# Patient Record
Sex: FEMALE | Race: BLACK OR AFRICAN AMERICAN | NOT HISPANIC OR LATINO | URBAN - METROPOLITAN AREA
[De-identification: names, ages, dates, MRNs, and addresses within clinical notes are randomized per-mention and may not be internally consistent; named-entity substitution may affect disease eponyms.]

---

## 2017-01-31 ENCOUNTER — INPATIENT (INPATIENT)
Facility: HOSPITAL | Age: 57
LOS: 2 days | Discharge: HOME | End: 2017-02-03
Attending: INTERNAL MEDICINE | Admitting: INTERNAL MEDICINE
Payer: COMMERCIAL

## 2017-01-31 VITALS
OXYGEN SATURATION: 99 % | RESPIRATION RATE: 18 BRPM | HEART RATE: 106 BPM | TEMPERATURE: 99 F | DIASTOLIC BLOOD PRESSURE: 83 MMHG | SYSTOLIC BLOOD PRESSURE: 121 MMHG

## 2017-01-31 PROBLEM — Z00.00 ENCOUNTER FOR PREVENTIVE HEALTH EXAMINATION: Status: ACTIVE | Noted: 2017-01-31

## 2017-01-31 LAB
ALBUMIN SERPL ELPH-MCNC: 4.3 G/DL — SIGNIFICANT CHANGE UP (ref 3.3–5)
ALP SERPL-CCNC: 76 U/L — SIGNIFICANT CHANGE UP (ref 40–120)
ALT FLD-CCNC: 24 U/L — SIGNIFICANT CHANGE UP (ref 4–33)
APPEARANCE UR: CLEAR — SIGNIFICANT CHANGE UP
APTT BLD: 35.9 SEC — SIGNIFICANT CHANGE UP (ref 27.5–37.4)
AST SERPL-CCNC: 26 U/L — SIGNIFICANT CHANGE UP (ref 4–32)
BACTERIA # UR AUTO: SIGNIFICANT CHANGE UP
BASE EXCESS BLDV CALC-SCNC: 4.7 MMOL/L — SIGNIFICANT CHANGE UP
BASOPHILS # BLD AUTO: 0.03 K/UL — SIGNIFICANT CHANGE UP (ref 0–0.2)
BASOPHILS NFR BLD AUTO: 0.5 % — SIGNIFICANT CHANGE UP (ref 0–2)
BILIRUB SERPL-MCNC: 0.4 MG/DL — SIGNIFICANT CHANGE UP (ref 0.2–1.2)
BILIRUB UR-MCNC: NEGATIVE — SIGNIFICANT CHANGE UP
BLOOD GAS VENOUS - CREATININE: 0.89 MG/DL — SIGNIFICANT CHANGE UP (ref 0.5–1.3)
BLOOD UR QL VISUAL: NEGATIVE — SIGNIFICANT CHANGE UP
BUN SERPL-MCNC: 14 MG/DL — SIGNIFICANT CHANGE UP (ref 7–23)
CALCIUM SERPL-MCNC: 9.4 MG/DL — SIGNIFICANT CHANGE UP (ref 8.4–10.5)
CHLORIDE BLDV-SCNC: 100 MMOL/L — SIGNIFICANT CHANGE UP (ref 96–108)
CHLORIDE SERPL-SCNC: 96 MMOL/L — LOW (ref 98–107)
CK MB BLD-MCNC: 1.4 — SIGNIFICANT CHANGE UP (ref 0–2.5)
CK MB BLD-MCNC: 2.43 NG/ML — SIGNIFICANT CHANGE UP (ref 1–4.7)
CK MB BLD-MCNC: 2.77 NG/ML — SIGNIFICANT CHANGE UP (ref 1–4.7)
CK SERPL-CCNC: 171 U/L — HIGH (ref 25–170)
CK SERPL-CCNC: 201 U/L — HIGH (ref 25–170)
CO2 SERPL-SCNC: 25 MMOL/L — SIGNIFICANT CHANGE UP (ref 22–31)
COLOR SPEC: SIGNIFICANT CHANGE UP
CREAT SERPL-MCNC: 0.92 MG/DL — SIGNIFICANT CHANGE UP (ref 0.5–1.3)
EOSINOPHIL # BLD AUTO: 0.01 K/UL — SIGNIFICANT CHANGE UP (ref 0–0.5)
EOSINOPHIL NFR BLD AUTO: 0.2 % — SIGNIFICANT CHANGE UP (ref 0–6)
GAS PNL BLDV: 135 MMOL/L — LOW (ref 136–146)
GLUCOSE BLDV-MCNC: 102 — HIGH (ref 70–99)
GLUCOSE SERPL-MCNC: 102 MG/DL — HIGH (ref 70–99)
GLUCOSE UR-MCNC: NEGATIVE — SIGNIFICANT CHANGE UP
HBA1C BLD-MCNC: 5.9 % — HIGH (ref 4–5.6)
HCO3 BLDV-SCNC: 27 MMOL/L — SIGNIFICANT CHANGE UP (ref 20–27)
HCT VFR BLD CALC: 36.2 % — SIGNIFICANT CHANGE UP (ref 34.5–45)
HCT VFR BLDV CALC: 36.9 % — SIGNIFICANT CHANGE UP (ref 34.5–45)
HGB BLD-MCNC: 11.7 G/DL — SIGNIFICANT CHANGE UP (ref 11.5–15.5)
HGB BLDV-MCNC: 12 G/DL — SIGNIFICANT CHANGE UP (ref 11.5–15.5)
IMM GRANULOCYTES NFR BLD AUTO: 0.3 % — SIGNIFICANT CHANGE UP (ref 0–1.5)
INR BLD: 1.12 — SIGNIFICANT CHANGE UP (ref 0.87–1.18)
KETONES UR-MCNC: NEGATIVE — SIGNIFICANT CHANGE UP
LACTATE BLDV-MCNC: 0.9 MMOL/L — SIGNIFICANT CHANGE UP (ref 0.5–2)
LEUKOCYTE ESTERASE UR-ACNC: HIGH
LYMPHOCYTES # BLD AUTO: 1.63 K/UL — SIGNIFICANT CHANGE UP (ref 1–3.3)
LYMPHOCYTES # BLD AUTO: 25.3 % — SIGNIFICANT CHANGE UP (ref 13–44)
MCHC RBC-ENTMCNC: 26.2 PG — LOW (ref 27–34)
MCHC RBC-ENTMCNC: 32.3 % — SIGNIFICANT CHANGE UP (ref 32–36)
MCV RBC AUTO: 81 FL — SIGNIFICANT CHANGE UP (ref 80–100)
MONOCYTES # BLD AUTO: 0.52 K/UL — SIGNIFICANT CHANGE UP (ref 0–0.9)
MONOCYTES NFR BLD AUTO: 8.1 % — SIGNIFICANT CHANGE UP (ref 2–14)
MUCOUS THREADS # UR AUTO: SIGNIFICANT CHANGE UP
NEUTROPHILS # BLD AUTO: 4.22 K/UL — SIGNIFICANT CHANGE UP (ref 1.8–7.4)
NEUTROPHILS NFR BLD AUTO: 65.6 % — SIGNIFICANT CHANGE UP (ref 43–77)
NITRITE UR-MCNC: NEGATIVE — SIGNIFICANT CHANGE UP
NON-SQ EPI CELLS # UR AUTO: 1 — SIGNIFICANT CHANGE UP
PCO2 BLDV: 51 MMHG — SIGNIFICANT CHANGE UP (ref 41–51)
PH BLDV: 7.38 PH — SIGNIFICANT CHANGE UP (ref 7.32–7.43)
PH UR: 6 — SIGNIFICANT CHANGE UP (ref 4.6–8)
PLATELET # BLD AUTO: 258 K/UL — SIGNIFICANT CHANGE UP (ref 150–400)
PMV BLD: 10.3 FL — SIGNIFICANT CHANGE UP (ref 7–13)
PO2 BLDV: < 24 MMHG — LOW (ref 35–40)
POTASSIUM BLDV-SCNC: 3.2 MMOL/L — LOW (ref 3.4–4.5)
POTASSIUM SERPL-MCNC: 3.4 MMOL/L — LOW (ref 3.5–5.3)
POTASSIUM SERPL-SCNC: 3.4 MMOL/L — LOW (ref 3.5–5.3)
PROT SERPL-MCNC: 8.2 G/DL — SIGNIFICANT CHANGE UP (ref 6–8.3)
PROT UR-MCNC: NEGATIVE — SIGNIFICANT CHANGE UP
PROTHROM AB SERPL-ACNC: 12.8 SEC — SIGNIFICANT CHANGE UP (ref 10–13.1)
RBC # BLD: 4.47 M/UL — SIGNIFICANT CHANGE UP (ref 3.8–5.2)
RBC # FLD: 16.1 % — HIGH (ref 10.3–14.5)
RBC CASTS # UR COMP ASSIST: SIGNIFICANT CHANGE UP (ref 0–?)
SAO2 % BLDV: 26.4 % — LOW (ref 60–85)
SODIUM SERPL-SCNC: 138 MMOL/L — SIGNIFICANT CHANGE UP (ref 135–145)
SP GR SPEC: 1.01 — SIGNIFICANT CHANGE UP (ref 1–1.03)
SQUAMOUS # UR AUTO: SIGNIFICANT CHANGE UP
TROPONIN T SERPL-MCNC: < 0.06 NG/ML — SIGNIFICANT CHANGE UP (ref 0–0.06)
TROPONIN T SERPL-MCNC: < 0.06 NG/ML — SIGNIFICANT CHANGE UP (ref 0–0.06)
TSH SERPL-MCNC: 0.4 UIU/ML — SIGNIFICANT CHANGE UP (ref 0.27–4.2)
UROBILINOGEN FLD QL: NORMAL E.U. — SIGNIFICANT CHANGE UP (ref 0.1–0.2)
WBC # BLD: 6.43 K/UL — SIGNIFICANT CHANGE UP (ref 3.8–10.5)
WBC # FLD AUTO: 6.43 K/UL — SIGNIFICANT CHANGE UP (ref 3.8–10.5)
WBC UR QL: SIGNIFICANT CHANGE UP (ref 0–?)

## 2017-01-31 RX ORDER — LOSARTAN POTASSIUM 100 MG/1
50 TABLET, FILM COATED ORAL DAILY
Qty: 0 | Refills: 0 | Status: DISCONTINUED | OUTPATIENT
Start: 2017-01-31 | End: 2017-02-01

## 2017-01-31 RX ORDER — SODIUM CHLORIDE 9 MG/ML
1000 INJECTION INTRAMUSCULAR; INTRAVENOUS; SUBCUTANEOUS ONCE
Qty: 0 | Refills: 0 | Status: COMPLETED | OUTPATIENT
Start: 2017-01-31 | End: 2017-01-31

## 2017-01-31 RX ADMIN — LOSARTAN POTASSIUM 50 MILLIGRAM(S): 100 TABLET, FILM COATED ORAL at 20:03

## 2017-01-31 RX ADMIN — SODIUM CHLORIDE 1000 MILLILITER(S): 9 INJECTION INTRAMUSCULAR; INTRAVENOUS; SUBCUTANEOUS at 12:05

## 2017-01-31 NOTE — ED ADULT NURSE NOTE - CAS EDN DISCHARGE ASSESSMENT
Patient baseline mental status Patient baseline mental status/Alert and oriented to person, place and time

## 2017-01-31 NOTE — ED PROVIDER NOTE - ATTENDING CONTRIBUTION TO CARE
DR Bloch-Patient c/o 2 weeks of palpitations, intermittently, not @ CP SOB, n. no change in meds, no hx of CAD, has htn, Well appearing NAD, HEENT nml, no thyromrgaly, Heart sounds, 3/6 JASON, lungs clear, no abd or chest wall tenderness. Reflexes nml, no calf pain. ECG with LVH and 1mm ST pita V2 no reciprocal changes. ocasional PVCs juliano patient reports as palpitations.- labs , CE, cxr. echo CDU

## 2017-01-31 NOTE — ED PROVIDER NOTE - OBJECTIVE STATEMENT
56F h/o HTN, HLD, GERD p/w intermittent palpitations worse at night when laying down x 2-3 weeks. Endorses CP lasting 5 minutes 2 weeks ago. No history of sudden death in family. Denies SOB, fever, vomiting, sick contacts, recent travel, leg pain, leg swelling, OCP use.

## 2017-01-31 NOTE — ED PROVIDER NOTE - MEDICAL DECISION MAKING DETAILS
Palpitations intermittent x 2-3 weeks. LVH, systolic murmur (old) and ?elevation on EKG.   - CBC, CMP, cardiac enzymes, TSH, CXR, reassess. Consider admission vs CDU for echo and r/o ACS

## 2017-01-31 NOTE — ED CDU PROVIDER NOTE - OBJECTIVE STATEMENT
56F h/o HTN, HLD, GERD presented to the ED with intermittent palpitations that are worse at night when laying down x 3 weeks. + CP 2 weeks ago lasting 5-10 minutes. went away on its own. no associated symptoms including n/v diaphoesis, headache, weakness, syncope  No carduiac hx in family that she knows of  Denies SOB, fever, vomiting, sick contacts, recent travel, leg pain, leg swelling, OCP use.  Has an appointment with a cardiologist but not until 2/28

## 2017-01-31 NOTE — ED CDU PROVIDER NOTE - MEDICAL DECISION MAKING DETAILS
55 yo f c/o cp and palpitations found to have murmur on exam. will do second set of cardiac enzymes and echo in AM. cardiology consult if needed

## 2017-01-31 NOTE — ED CDU PROVIDER NOTE - PROGRESS NOTE DETAILS
CDU - SHASHI Hidalgo Pt. currently resting comfortably, no complaints at this time. Will continue current plan and observation. : 56F h/o HTN, HLD, GERD presented to the ED with intermittent palpitations that are worse at night when laying down x 3 weeks.pt remained stable overnight and exam remarkable for murmur . will obtain echo this AM SHASHI [place]: Pt noted to have severe MR and severe LVOT on echo, pt is hemodynamically stable in NAD, called cardiology for consult.  Discharge Note pt remained stable pt ok to go home depending on cardiology recommendation with out pt follow up . CDU SHASHI Robertson Addendum-------  I rec'd sign-out on this patient by CDU day attending Dr. Paz and CDU day SHASHI Canada at 1900 hrs.  In interim, pt has been resting comfortably.  The cardiology fellow came and evaluated this patient; he advised to discontinue Losartan and HCTZ (verbalizes awareness that pt had dose of each this morning) and start pt on metoprolol 25 mg tonight; states if HR tolerates med, can increase to metoprolol 50 mg BID on 2/2/17.  In addition, would like pt to get normal saline 250 mL bolus now; states can admit to Tele under Dr. Sanchez; card attending to see pt in the morning.  I spoke to pt about the plan; pt verbalizes agreement with plan.  Tele PA verbally notified by me of Tele admission.

## 2017-02-01 DIAGNOSIS — Q24.8 OTHER SPECIFIED CONGENITAL MALFORMATIONS OF HEART: ICD-10-CM

## 2017-02-01 DIAGNOSIS — E87.6 HYPOKALEMIA: ICD-10-CM

## 2017-02-01 DIAGNOSIS — I10 ESSENTIAL (PRIMARY) HYPERTENSION: ICD-10-CM

## 2017-02-01 DIAGNOSIS — E78.5 HYPERLIPIDEMIA, UNSPECIFIED: ICD-10-CM

## 2017-02-01 DIAGNOSIS — Z41.8 ENCOUNTER FOR OTHER PROCEDURES FOR PURPOSES OTHER THAN REMEDYING HEALTH STATE: ICD-10-CM

## 2017-02-01 DIAGNOSIS — R01.1 CARDIAC MURMUR, UNSPECIFIED: ICD-10-CM

## 2017-02-01 DIAGNOSIS — K21.9 GASTRO-ESOPHAGEAL REFLUX DISEASE WITHOUT ESOPHAGITIS: ICD-10-CM

## 2017-02-01 DIAGNOSIS — N39.0 URINARY TRACT INFECTION, SITE NOT SPECIFIED: ICD-10-CM

## 2017-02-01 LAB
APPEARANCE UR: CLEAR — SIGNIFICANT CHANGE UP
BACTERIA # UR AUTO: SIGNIFICANT CHANGE UP
BILIRUB UR-MCNC: NEGATIVE — SIGNIFICANT CHANGE UP
BLOOD UR QL VISUAL: NEGATIVE — SIGNIFICANT CHANGE UP
COLOR SPEC: SIGNIFICANT CHANGE UP
GLUCOSE UR-MCNC: NEGATIVE — SIGNIFICANT CHANGE UP
KETONES UR-MCNC: NEGATIVE — SIGNIFICANT CHANGE UP
LEUKOCYTE ESTERASE UR-ACNC: HIGH
MUCOUS THREADS # UR AUTO: SIGNIFICANT CHANGE UP
NITRITE UR-MCNC: NEGATIVE — SIGNIFICANT CHANGE UP
PH UR: 6.5 — SIGNIFICANT CHANGE UP (ref 4.6–8)
PROT UR-MCNC: NEGATIVE — SIGNIFICANT CHANGE UP
RBC CASTS # UR COMP ASSIST: SIGNIFICANT CHANGE UP (ref 0–?)
SP GR SPEC: 1.01 — SIGNIFICANT CHANGE UP (ref 1–1.03)
SQUAMOUS # UR AUTO: SIGNIFICANT CHANGE UP
UROBILINOGEN FLD QL: NORMAL E.U. — SIGNIFICANT CHANGE UP (ref 0.1–0.2)
WBC UR QL: HIGH (ref 0–?)

## 2017-02-01 PROCEDURE — 93306 TTE W/DOPPLER COMPLETE: CPT | Mod: 26

## 2017-02-01 RX ORDER — METOPROLOL TARTRATE 50 MG
50 TABLET ORAL
Qty: 0 | Refills: 0 | Status: DISCONTINUED | OUTPATIENT
Start: 2017-02-01 | End: 2017-02-01

## 2017-02-01 RX ORDER — METOPROLOL TARTRATE 50 MG
25 TABLET ORAL ONCE
Qty: 0 | Refills: 0 | Status: COMPLETED | OUTPATIENT
Start: 2017-02-01 | End: 2017-02-01

## 2017-02-01 RX ORDER — METOPROLOL TARTRATE 50 MG
50 TABLET ORAL
Qty: 0 | Refills: 0 | Status: DISCONTINUED | OUTPATIENT
Start: 2017-02-02 | End: 2017-02-03

## 2017-02-01 RX ORDER — SODIUM CHLORIDE 9 MG/ML
250 INJECTION INTRAMUSCULAR; INTRAVENOUS; SUBCUTANEOUS ONCE
Qty: 0 | Refills: 0 | Status: COMPLETED | OUTPATIENT
Start: 2017-02-01 | End: 2017-02-01

## 2017-02-01 RX ADMIN — LOSARTAN POTASSIUM 50 MILLIGRAM(S): 100 TABLET, FILM COATED ORAL at 06:02

## 2017-02-01 RX ADMIN — SODIUM CHLORIDE 250 MILLILITER(S): 9 INJECTION INTRAMUSCULAR; INTRAVENOUS; SUBCUTANEOUS at 20:14

## 2017-02-01 RX ADMIN — Medication 25 MILLIGRAM(S): at 20:13

## 2017-02-01 NOTE — H&P ADULT. - RS GEN PE MLT RESP DETAILS PC
respirations non-labored/no rales/airway patent/breath sounds equal/normal/no rhonchi/no wheezes/no chest wall tenderness/good air movement/clear to auscultation bilaterally/no intercostal retractions

## 2017-02-01 NOTE — H&P ADULT. - PROBLEM SELECTOR PLAN 6
Routine blood pressure check.   DASH diet  Start metoprolol 25 mg, and increase to 50 mg BID if patient tolerates.

## 2017-02-01 NOTE — H&P ADULT. - ASSESSMENT
57 y/o F with h/o HTN, HLD, GERD, presents to the DE with intermittent palpitations for 3 weeks. Admit to telemetry for LVOT obstruction.

## 2017-02-01 NOTE — H&P ADULT. - HISTORY OF PRESENT ILLNESS
55 y/o F with h/o HTN, HLD,GERD, presents to the ED with intermittent palpitations for 2 weeks. Pt states the palpitations are usually worse at night and have been becoming increasingly worse. Pt reports it is associated with fatigue. Pt also states she has been having chest pain, usually lasting 5-10 minutes, midsternal, nonradiating. Pt denies syncope, LOC, shortness of breath, weakness, fever, chills, headache, N/V/D/C, peripheral edema, dysuria, hematuria, urinary/bowel incontinence or any other complaints at this time.

## 2017-02-01 NOTE — H&P ADULT. - GASTROINTESTINAL DETAILS
no rebound tenderness/normal/no guarding/no rigidity/bowel sounds normal/no distention/nontender/soft

## 2017-02-01 NOTE — H&P ADULT. - NEGATIVE ENMT SYMPTOMS
no recurrent cold sores/no nasal obstruction/no nasal congestion/no vertigo/no tinnitus/no hearing difficulty/no ear pain/no nose bleeds/no post-nasal discharge/no nasal discharge/no sinus symptoms

## 2017-02-01 NOTE — H&P ADULT. - NEGATIVE MUSCULOSKELETAL SYMPTOMS
no arthritis/no arm pain L/no myalgia/no arm pain R/no leg pain L/no back pain/no leg pain R/no muscle cramps/no joint swelling/no arthralgia/no muscle weakness/no neck pain/no stiffness

## 2017-02-01 NOTE — PATIENT PROFILE ADULT. - ABILITY TO HEAR (WITH HEARING AID OR HEARING APPLIANCE IF NORMALLY USED):
[place] R ear/Mildly to Moderately Impaired: difficulty hearing in some environments or speaker may need to increase volume or speak distinctly

## 2017-02-01 NOTE — H&P ADULT. - NEGATIVE OPHTHALMOLOGIC SYMPTOMS
no lacrimation L/no blurred vision L/no diplopia/no blurred vision R/no lacrimation R/no photophobia

## 2017-02-01 NOTE — H&P ADULT. - NEUROLOGICAL DETAILS
normal strength/responds to verbal commands/alert and oriented x 3/sensation intact/cranial nerves intact

## 2017-02-02 LAB
BUN SERPL-MCNC: 11 MG/DL — SIGNIFICANT CHANGE UP (ref 7–23)
CALCIUM SERPL-MCNC: 9.4 MG/DL — SIGNIFICANT CHANGE UP (ref 8.4–10.5)
CHLORIDE SERPL-SCNC: 100 MMOL/L — SIGNIFICANT CHANGE UP (ref 98–107)
CHOLEST SERPL-MCNC: 174 MG/DL — SIGNIFICANT CHANGE UP (ref 120–199)
CO2 SERPL-SCNC: 25 MMOL/L — SIGNIFICANT CHANGE UP (ref 22–31)
CREAT SERPL-MCNC: 0.82 MG/DL — SIGNIFICANT CHANGE UP (ref 0.5–1.3)
GLUCOSE SERPL-MCNC: 99 MG/DL — SIGNIFICANT CHANGE UP (ref 70–99)
HCT VFR BLD CALC: 35.6 % — SIGNIFICANT CHANGE UP (ref 34.5–45)
HDLC SERPL-MCNC: 63 MG/DL — SIGNIFICANT CHANGE UP (ref 45–65)
HGB BLD-MCNC: 11.3 G/DL — LOW (ref 11.5–15.5)
LIPID PNL WITH DIRECT LDL SERPL: 111 MG/DL — SIGNIFICANT CHANGE UP
MAGNESIUM SERPL-MCNC: 2 MG/DL — SIGNIFICANT CHANGE UP (ref 1.6–2.6)
MCHC RBC-ENTMCNC: 25.8 PG — LOW (ref 27–34)
MCHC RBC-ENTMCNC: 31.7 % — LOW (ref 32–36)
MCV RBC AUTO: 81.3 FL — SIGNIFICANT CHANGE UP (ref 80–100)
PLATELET # BLD AUTO: 241 K/UL — SIGNIFICANT CHANGE UP (ref 150–400)
PMV BLD: 10.4 FL — SIGNIFICANT CHANGE UP (ref 7–13)
POTASSIUM SERPL-MCNC: 3.2 MMOL/L — LOW (ref 3.5–5.3)
POTASSIUM SERPL-SCNC: 3.2 MMOL/L — LOW (ref 3.5–5.3)
RBC # BLD: 4.38 M/UL — SIGNIFICANT CHANGE UP (ref 3.8–5.2)
RBC # FLD: 16.1 % — HIGH (ref 10.3–14.5)
SODIUM SERPL-SCNC: 139 MMOL/L — SIGNIFICANT CHANGE UP (ref 135–145)
SPECIMEN SOURCE: SIGNIFICANT CHANGE UP
TRIGL SERPL-MCNC: 63 MG/DL — SIGNIFICANT CHANGE UP (ref 10–149)
WBC # BLD: 6.72 K/UL — SIGNIFICANT CHANGE UP (ref 3.8–10.5)
WBC # FLD AUTO: 6.72 K/UL — SIGNIFICANT CHANGE UP (ref 3.8–10.5)

## 2017-02-02 PROCEDURE — 76376 3D RENDER W/INTRP POSTPROCES: CPT | Mod: 26

## 2017-02-02 PROCEDURE — 93325 DOPPLER ECHO COLOR FLOW MAPG: CPT | Mod: 26,GC

## 2017-02-02 PROCEDURE — 93320 DOPPLER ECHO COMPLETE: CPT | Mod: 26,GC

## 2017-02-02 PROCEDURE — 99232 SBSQ HOSP IP/OBS MODERATE 35: CPT

## 2017-02-02 PROCEDURE — 93312 ECHO TRANSESOPHAGEAL: CPT | Mod: 26

## 2017-02-02 RX ORDER — METOPROLOL TARTRATE 50 MG
25 TABLET ORAL ONCE
Qty: 0 | Refills: 0 | Status: COMPLETED | OUTPATIENT
Start: 2017-02-02 | End: 2017-02-02

## 2017-02-02 RX ORDER — SODIUM CHLORIDE 9 MG/ML
1000 INJECTION INTRAMUSCULAR; INTRAVENOUS; SUBCUTANEOUS
Qty: 0 | Refills: 0 | Status: DISCONTINUED | OUTPATIENT
Start: 2017-02-02 | End: 2017-02-03

## 2017-02-02 RX ORDER — POTASSIUM CHLORIDE 20 MEQ
40 PACKET (EA) ORAL EVERY 4 HOURS
Qty: 0 | Refills: 0 | Status: COMPLETED | OUTPATIENT
Start: 2017-02-02 | End: 2017-02-02

## 2017-02-02 RX ORDER — POTASSIUM CHLORIDE 20 MEQ
10 PACKET (EA) ORAL
Qty: 0 | Refills: 0 | Status: COMPLETED | OUTPATIENT
Start: 2017-02-02 | End: 2017-02-02

## 2017-02-02 RX ORDER — SODIUM CHLORIDE 9 MG/ML
250 INJECTION INTRAMUSCULAR; INTRAVENOUS; SUBCUTANEOUS ONCE
Qty: 0 | Refills: 0 | Status: COMPLETED | OUTPATIENT
Start: 2017-02-02 | End: 2017-02-02

## 2017-02-02 RX ADMIN — SODIUM CHLORIDE 100 MILLILITER(S): 9 INJECTION INTRAMUSCULAR; INTRAVENOUS; SUBCUTANEOUS at 20:38

## 2017-02-02 RX ADMIN — Medication 100 MILLIEQUIVALENT(S): at 14:12

## 2017-02-02 RX ADMIN — Medication 50 MILLIGRAM(S): at 19:19

## 2017-02-02 RX ADMIN — SODIUM CHLORIDE 500 MILLILITER(S): 9 INJECTION INTRAMUSCULAR; INTRAVENOUS; SUBCUTANEOUS at 11:11

## 2017-02-02 RX ADMIN — SODIUM CHLORIDE 100 MILLILITER(S): 9 INJECTION INTRAMUSCULAR; INTRAVENOUS; SUBCUTANEOUS at 15:00

## 2017-02-02 RX ADMIN — Medication 100 MILLIEQUIVALENT(S): at 12:32

## 2017-02-02 RX ADMIN — Medication 25 MILLIGRAM(S): at 11:11

## 2017-02-02 RX ADMIN — Medication 100 MILLIEQUIVALENT(S): at 15:31

## 2017-02-03 ENCOUNTER — TRANSCRIPTION ENCOUNTER (OUTPATIENT)
Age: 57
End: 2017-02-03

## 2017-02-03 VITALS
RESPIRATION RATE: 16 BRPM | OXYGEN SATURATION: 100 % | DIASTOLIC BLOOD PRESSURE: 64 MMHG | SYSTOLIC BLOOD PRESSURE: 116 MMHG | TEMPERATURE: 99 F | HEART RATE: 95 BPM

## 2017-02-03 LAB
-  AMIKACIN: SIGNIFICANT CHANGE UP
-  AMPICILLIN/SULBACTAM: SIGNIFICANT CHANGE UP
-  AMPICILLIN: SIGNIFICANT CHANGE UP
-  AZTREONAM: SIGNIFICANT CHANGE UP
-  CEFAZOLIN: SIGNIFICANT CHANGE UP
-  CEFEPIME: SIGNIFICANT CHANGE UP
-  CEFOXITIN: SIGNIFICANT CHANGE UP
-  CEFTAZIDIME: SIGNIFICANT CHANGE UP
-  CEFTRIAXONE: SIGNIFICANT CHANGE UP
-  CIPROFLOXACIN: SIGNIFICANT CHANGE UP
-  ERTAPENEM: SIGNIFICANT CHANGE UP
-  GENTAMICIN: SIGNIFICANT CHANGE UP
-  LEVOFLOXACIN: SIGNIFICANT CHANGE UP
-  MEROPENEM: SIGNIFICANT CHANGE UP
-  NITROFURANTOIN: SIGNIFICANT CHANGE UP
-  PIPERACILLIN/TAZOBACTAM: SIGNIFICANT CHANGE UP
-  TOBRAMYCIN: SIGNIFICANT CHANGE UP
-  TRIMETHOPRIM/SULFAMETHOXAZOLE: SIGNIFICANT CHANGE UP
BACTERIA UR CULT: SIGNIFICANT CHANGE UP
BASOPHILS # BLD AUTO: 0.02 K/UL — SIGNIFICANT CHANGE UP (ref 0–0.2)
BASOPHILS NFR BLD AUTO: 0.4 % — SIGNIFICANT CHANGE UP (ref 0–2)
BUN SERPL-MCNC: 15 MG/DL — SIGNIFICANT CHANGE UP (ref 7–23)
CALCIUM SERPL-MCNC: 9 MG/DL — SIGNIFICANT CHANGE UP (ref 8.4–10.5)
CHLORIDE SERPL-SCNC: 105 MMOL/L — SIGNIFICANT CHANGE UP (ref 98–107)
CO2 SERPL-SCNC: 23 MMOL/L — SIGNIFICANT CHANGE UP (ref 22–31)
CREAT SERPL-MCNC: 0.76 MG/DL — SIGNIFICANT CHANGE UP (ref 0.5–1.3)
EOSINOPHIL # BLD AUTO: 0.06 K/UL — SIGNIFICANT CHANGE UP (ref 0–0.5)
EOSINOPHIL NFR BLD AUTO: 1.1 % — SIGNIFICANT CHANGE UP (ref 0–6)
GLUCOSE SERPL-MCNC: 109 MG/DL — HIGH (ref 70–99)
HCT VFR BLD CALC: 33 % — LOW (ref 34.5–45)
HGB BLD-MCNC: 10.5 G/DL — LOW (ref 11.5–15.5)
IMM GRANULOCYTES NFR BLD AUTO: 0.2 % — SIGNIFICANT CHANGE UP (ref 0–1.5)
LYMPHOCYTES # BLD AUTO: 1.3 K/UL — SIGNIFICANT CHANGE UP (ref 1–3.3)
LYMPHOCYTES # BLD AUTO: 24.1 % — SIGNIFICANT CHANGE UP (ref 13–44)
MAGNESIUM SERPL-MCNC: 1.9 MG/DL — SIGNIFICANT CHANGE UP (ref 1.6–2.6)
MCHC RBC-ENTMCNC: 26.1 PG — LOW (ref 27–34)
MCHC RBC-ENTMCNC: 31.8 % — LOW (ref 32–36)
MCV RBC AUTO: 81.9 FL — SIGNIFICANT CHANGE UP (ref 80–100)
METHOD TYPE: SIGNIFICANT CHANGE UP
MONOCYTES # BLD AUTO: 0.3 K/UL — SIGNIFICANT CHANGE UP (ref 0–0.9)
MONOCYTES NFR BLD AUTO: 5.6 % — SIGNIFICANT CHANGE UP (ref 2–14)
NEUTROPHILS # BLD AUTO: 3.7 K/UL — SIGNIFICANT CHANGE UP (ref 1.8–7.4)
NEUTROPHILS NFR BLD AUTO: 68.6 % — SIGNIFICANT CHANGE UP (ref 43–77)
ORGANISM # SPEC MICROSCOPIC CNT: SIGNIFICANT CHANGE UP
ORGANISM # SPEC MICROSCOPIC CNT: SIGNIFICANT CHANGE UP
PLATELET # BLD AUTO: 238 K/UL — SIGNIFICANT CHANGE UP (ref 150–400)
PMV BLD: 11.2 FL — SIGNIFICANT CHANGE UP (ref 7–13)
POTASSIUM SERPL-MCNC: 3.9 MMOL/L — SIGNIFICANT CHANGE UP (ref 3.5–5.3)
POTASSIUM SERPL-SCNC: 3.9 MMOL/L — SIGNIFICANT CHANGE UP (ref 3.5–5.3)
RBC # BLD: 4.03 M/UL — SIGNIFICANT CHANGE UP (ref 3.8–5.2)
RBC # FLD: 16.1 % — HIGH (ref 10.3–14.5)
SODIUM SERPL-SCNC: 139 MMOL/L — SIGNIFICANT CHANGE UP (ref 135–145)
WBC # BLD: 5.39 K/UL — SIGNIFICANT CHANGE UP (ref 3.8–10.5)
WBC # FLD AUTO: 5.39 K/UL — SIGNIFICANT CHANGE UP (ref 3.8–10.5)

## 2017-02-03 PROCEDURE — 99238 HOSP IP/OBS DSCHRG MGMT 30/<: CPT

## 2017-02-03 RX ORDER — METOPROLOL TARTRATE 50 MG
1 TABLET ORAL
Qty: 0 | Refills: 0 | COMMUNITY
Start: 2017-02-03

## 2017-02-03 RX ORDER — METOPROLOL TARTRATE 50 MG
1 TABLET ORAL
Qty: 60 | Refills: 0 | OUTPATIENT
Start: 2017-02-03 | End: 2017-03-05

## 2017-02-03 RX ADMIN — SODIUM CHLORIDE 100 MILLILITER(S): 9 INJECTION INTRAMUSCULAR; INTRAVENOUS; SUBCUTANEOUS at 05:17

## 2017-02-03 RX ADMIN — Medication 50 MILLIGRAM(S): at 05:17

## 2017-02-03 NOTE — DISCHARGE NOTE ADULT - CARE PROVIDER_API CALL
Steve Sanchez), Cardiovascular Disease; Internal Medicine; Nuclear Cardiology  [address] [facility] [place] [address]  Phone: [phone]  Fax: [phone]    Dr. Tho Preciado ( [phone]),   PCP  Phone: (   )    -  Fax: (   )    -

## 2017-02-03 NOTE — DISCHARGE NOTE ADULT - MEDICATION SUMMARY - MEDICATIONS TO STOP TAKING
I will STOP taking the medications listed below when I get home from the hospital:    losartan-hydroCHLOROthiazide 50mg-12.5mg oral tablet  -- 1 tab(s) by mouth once a day

## 2017-02-03 NOTE — DISCHARGE NOTE ADULT - NS AS ACTIVITY OBS
Walking-Outdoors allowed/activity as tolerated/Showering allowed/Walking-Indoors allowed/Stairs allowed

## 2017-02-03 NOTE — DISCHARGE NOTE ADULT - PLAN OF CARE
low salt diet, your Losartan/ Hydrochlorothiazide combo medication was stopped and you were started on Metoprolol instead repair valve follow up with Cardiologist Dr. Sanchez in 1-2 weeks, call to make an appointment to continue workup for heart valve follow up with Cardiologist Dr. Sanchez in 1-2 weeks, call to make an appointment to continue workup, continue your metoprolol as ordered

## 2017-02-03 NOTE — DISCHARGE NOTE ADULT - CARE PROVIDERS DIRECT ADDRESSES
,[email or web address],[email or web address]

## 2017-02-03 NOTE — DISCHARGE NOTE ADULT - MEDICATION SUMMARY - MEDICATIONS TO TAKE
I will START or STAY ON the medications listed below when I get home from the hospital:    metoprolol tartrate 50 mg oral tablet  -- 1 tab(s) by mouth 2 times a day  -- Indication: For HTN (hypertension)/Mitral Regurgitation I will START or STAY ON the medications listed below when I get home from the hospital:    metoprolol tartrate 50 mg oral tablet  -- 1 tab(s) by mouth 2 times a day  -- Indication: For HTN (hypertension), Mitral regurgitation

## 2017-02-03 NOTE — DISCHARGE NOTE ADULT - ADDITIONAL INSTRUCTIONS
follow up with Cardiologist Dr. Sanchez in 1-2 weeks, call to make an appointment to continue workup for heart valve

## 2017-02-03 NOTE — DISCHARGE NOTE ADULT - ABILITY TO HEAR (WITH HEARING AID OR HEARING APPLIANCE IF NORMALLY USED):
[place] R ear/Mildly to Moderately Impaired: difficulty hearing in some environments or speaker may need to increase volume or speak distinctly

## 2017-02-03 NOTE — DISCHARGE NOTE ADULT - PROVIDER TOKENS
TOKEN:'[id number]',FREE:[LAST:[Dr. Tho Preciado ( [phone])],PHONE:[(   )    -],FAX:[(   )    -],ADDRESS:[PCP]]

## 2017-02-03 NOTE — DISCHARGE NOTE ADULT - HOSPITAL COURSE
57 y/o F with h/o HTN, HLD,GERD, presents to the ED with intermittent palpitations for 2 weeks. Pt states the palpitations are usually worse at night and have been becoming increasingly worse. Pt reports it is associated with fatigue. Pt also states she has been having chest pain, usually lasting 5-10 minutes, midsternal, nonradiating. Pt denies syncope, LOC, shortness of breath, weakness, fever, chills, headache, N/V/D/C, peripheral edema, dysuria, hematuria, urinary/bowel incontinence or any other complaints at this time.     CE X2: negative  CXR: Clear lungs. No pleural effusions or pneumothorax. Heart size at upper limits of normal. Unremarkable remaining mediastinal and hilar contours.  Trachea midline. Unremarkable osseous structures.  2/1 TTE:Systolic anterior motion of the mitral valve. Severe mitral regurgitation with an eccentric, posteriorly directed jet.  Calcified trileaflet aortic valve with normal opening. Mild concentric left ventricular hypertrophy. Hyperdynamic left ventricle. Peak left ventricular  outflow tract gradient equals 198 mm Hg, mean gradient is equal to 94 mm Hg, consistent with severe LVOT obstruction. Some views are suggestive of a possible subaortic membrane, which may be contributing to/causing the severe  degree of LVOT obstruction. Normal right ventricular size and function. Estimated right ventricular systolic pressure equals 38 mm Hg, assuming right atrial pressure equals 10 mm Hg, consistent with borderline pulmonary hypertension. Recommend MARISA for further evaluation of a possible subaortic membrane as a cause for severe LVOT obstruction, if clinically indicated.  2/1 CARDIO: severe LVOT obstruction, d/c losartan, HCTZ, [id number] cc IVF bolus, give metoprolol 25 po , if tolerates can increase to 50 po bid in am   2/2 MARISA: 1. Systolic anterior motion of the mitral valve. Severe mitral regurgitation with an eccentric, posteriorly directed jet.  Vena contracta width about  0.8 cm.2. Calcified trileaflet aortic valve with normal opening.  Mild aortic regurgitation.3. Normal aortic root, aortic arch and descending thoracic aorta.4. Normal left atrium.  No left atrium or left atrial appendage thrombus.5. Normal left ventricular systolic function. No segmental wall motion abnormalities. Peak left ventricular outflow tract gradient equals 170 mm Hg, mean gradient is equal to66 mm Hg, consistent with severe LVOT obstruction.  A small (about  4 mm), linear echodensity is visualized emanating from the proximal interventricular septum and may be consistent with a small subaortic membrane. 6. Normal right ventricular size and function. 7. Contrast injection demonstrates no evidence of a patent foramen ovale.  2/3 Patient tolerated increase of BB to Metoprolol 50mg bid, patient now stable for d/c home and outpatient follow up for continued work up with Cardiologist for severe Mitral Regurgitation and subaortic membrane.

## 2017-02-03 NOTE — DISCHARGE NOTE ADULT - CARE PLAN
Principal Discharge DX:	Severe mitral regurgitation  Goal:	repair valve  Instructions for follow-up, activity and diet:	follow up with Cardiologist Dr. Sanchez in 1-2 weeks, call to make an appointment to continue workup for heart valve  Secondary Diagnosis:	Subaortic membrane  Instructions for follow-up, activity and diet:	follow up with Cardiologist Dr. Sanchez in 1-2 weeks, call to make an appointment to continue workup, continue your metoprolol as ordered  Secondary Diagnosis:	Left ventricular outflow tract obstruction  Instructions for follow-up, activity and diet:	follow up with Cardiologist Dr. Sanchez in 1-2 weeks, call to make an appointment to continue workup, continue your metoprolol as ordered  Secondary Diagnosis:	HTN (hypertension)  Instructions for follow-up, activity and diet:	low salt diet, your Losartan/ Hydrochlorothiazide combo medication was stopped and you were started on Metoprolol instead

## 2017-02-03 NOTE — DISCHARGE NOTE ADULT - PATIENT PORTAL LINK FT
“You can access the [facility] Patient Portal, offered by [facility], by registering with the following website: [email or web address]”

## 2017-03-02 PROBLEM — K21.9 GASTRO-ESOPHAGEAL REFLUX DISEASE WITHOUT ESOPHAGITIS: Chronic | Status: ACTIVE | Noted: 2017-01-31

## 2017-03-02 PROBLEM — E78.5 HYPERLIPIDEMIA, UNSPECIFIED: Chronic | Status: ACTIVE | Noted: 2017-01-31

## 2017-03-02 PROBLEM — I10 ESSENTIAL (PRIMARY) HYPERTENSION: Chronic | Status: ACTIVE | Noted: 2017-01-31

## 2017-03-07 PROBLEM — I34.0 MITRAL REGURGITATION: Status: ACTIVE | Noted: 2017-03-07

## 2017-03-07 PROBLEM — K21.9 GERD (GASTROESOPHAGEAL REFLUX DISEASE): Status: ACTIVE | Noted: 2017-03-07

## 2017-03-07 PROBLEM — Z82.49 FAMILY HISTORY OF ESSENTIAL HYPERTENSION: Status: ACTIVE | Noted: 2017-03-07

## 2017-03-07 PROBLEM — R00.2 PALPITATIONS: Status: ACTIVE | Noted: 2017-03-07

## 2017-03-07 PROBLEM — R07.89 ATYPICAL CHEST PAIN: Status: ACTIVE | Noted: 2017-03-07

## 2017-03-07 PROBLEM — E78.5 HYPERLIPIDEMIA: Status: ACTIVE | Noted: 2017-03-07

## 2017-03-07 PROBLEM — I10 HYPERTENSION: Status: ACTIVE | Noted: 2017-03-07

## 2017-03-07 RX ORDER — FAMOTIDINE 20 MG/1
20 TABLET, FILM COATED ORAL
Refills: 0 | Status: ACTIVE | COMMUNITY

## 2017-03-08 ENCOUNTER — APPOINTMENT (OUTPATIENT)
Dept: CARDIOTHORACIC SURGERY | Facility: CLINIC | Age: 57
End: 2017-03-08

## 2017-03-08 VITALS
HEART RATE: 110 BPM | TEMPERATURE: 98.5 F | BODY MASS INDEX: 25.1 KG/M2 | WEIGHT: 147 LBS | HEIGHT: 64 IN | SYSTOLIC BLOOD PRESSURE: 108 MMHG | RESPIRATION RATE: 15 BRPM | OXYGEN SATURATION: 99 % | DIASTOLIC BLOOD PRESSURE: 77 MMHG

## 2017-03-08 VITALS — DIASTOLIC BLOOD PRESSURE: 71 MMHG | SYSTOLIC BLOOD PRESSURE: 99 MMHG

## 2017-03-08 DIAGNOSIS — R07.89 OTHER CHEST PAIN: ICD-10-CM

## 2017-03-08 DIAGNOSIS — Z82.49 FAMILY HISTORY OF ISCHEMIC HEART DISEASE AND OTHER DISEASES OF THE CIRCULATORY SYSTEM: ICD-10-CM

## 2017-03-08 DIAGNOSIS — K21.9 GASTRO-ESOPHAGEAL REFLUX DISEASE W/OUT ESOPHAGITIS: ICD-10-CM

## 2017-03-08 DIAGNOSIS — R00.2 PALPITATIONS: ICD-10-CM

## 2017-03-08 DIAGNOSIS — E78.5 HYPERLIPIDEMIA, UNSPECIFIED: ICD-10-CM

## 2017-03-08 DIAGNOSIS — I10 ESSENTIAL (PRIMARY) HYPERTENSION: ICD-10-CM

## 2017-03-08 DIAGNOSIS — I34.0 NONRHEUMATIC MITRAL (VALVE) INSUFFICIENCY: ICD-10-CM

## 2017-03-16 ENCOUNTER — OUTPATIENT (OUTPATIENT)
Dept: OUTPATIENT SERVICES | Facility: HOSPITAL | Age: 57
LOS: 1 days | End: 2017-03-16
Payer: COMMERCIAL

## 2017-03-16 VITALS
WEIGHT: 147.05 LBS | HEART RATE: 73 BPM | DIASTOLIC BLOOD PRESSURE: 78 MMHG | SYSTOLIC BLOOD PRESSURE: 141 MMHG | TEMPERATURE: 98 F | HEIGHT: 64 IN | RESPIRATION RATE: 18 BRPM | OXYGEN SATURATION: 98 %

## 2017-03-16 DIAGNOSIS — I34.0 NONRHEUMATIC MITRAL (VALVE) INSUFFICIENCY: ICD-10-CM

## 2017-03-16 LAB
ALBUMIN SERPL ELPH-MCNC: 4 G/DL — SIGNIFICANT CHANGE UP (ref 3.3–5)
ALP SERPL-CCNC: 75 U/L — SIGNIFICANT CHANGE UP (ref 40–120)
ALT FLD-CCNC: 23 U/L RC — SIGNIFICANT CHANGE UP (ref 10–45)
ANION GAP SERPL CALC-SCNC: 14 MMOL/L — SIGNIFICANT CHANGE UP (ref 5–17)
AST SERPL-CCNC: 25 U/L — SIGNIFICANT CHANGE UP (ref 10–40)
BILIRUB SERPL-MCNC: 0.3 MG/DL — SIGNIFICANT CHANGE UP (ref 0.2–1.2)
BUN SERPL-MCNC: 11 MG/DL — SIGNIFICANT CHANGE UP (ref 7–23)
CALCIUM SERPL-MCNC: 9.7 MG/DL — SIGNIFICANT CHANGE UP (ref 8.4–10.5)
CHLORIDE SERPL-SCNC: 106 MMOL/L — SIGNIFICANT CHANGE UP (ref 96–108)
CO2 SERPL-SCNC: 25 MMOL/L — SIGNIFICANT CHANGE UP (ref 22–31)
CREAT SERPL-MCNC: 0.77 MG/DL — SIGNIFICANT CHANGE UP (ref 0.5–1.3)
GLUCOSE SERPL-MCNC: 96 MG/DL — SIGNIFICANT CHANGE UP (ref 70–99)
HCT VFR BLD CALC: 33.9 % — LOW (ref 34.5–45)
HGB BLD-MCNC: 10.7 G/DL — LOW (ref 11.5–15.5)
MCHC RBC-ENTMCNC: 25.9 PG — LOW (ref 27–34)
MCHC RBC-ENTMCNC: 31.5 GM/DL — LOW (ref 32–36)
MCV RBC AUTO: 82.3 FL — SIGNIFICANT CHANGE UP (ref 80–100)
PLATELET # BLD AUTO: 306 K/UL — SIGNIFICANT CHANGE UP (ref 150–400)
POTASSIUM SERPL-MCNC: 4.3 MMOL/L — SIGNIFICANT CHANGE UP (ref 3.5–5.3)
POTASSIUM SERPL-SCNC: 4.3 MMOL/L — SIGNIFICANT CHANGE UP (ref 3.5–5.3)
PROT SERPL-MCNC: 7.8 G/DL — SIGNIFICANT CHANGE UP (ref 6–8.3)
RBC # BLD: 4.12 M/UL — SIGNIFICANT CHANGE UP (ref 3.8–5.2)
RBC # FLD: 15 % — HIGH (ref 10.3–14.5)
SODIUM SERPL-SCNC: 145 MMOL/L — SIGNIFICANT CHANGE UP (ref 135–145)
WBC # BLD: 3.6 K/UL — LOW (ref 3.8–10.5)
WBC # FLD AUTO: 3.6 K/UL — LOW (ref 3.8–10.5)

## 2017-03-16 PROCEDURE — 93456 R HRT CORONARY ARTERY ANGIO: CPT | Mod: 26,GC

## 2017-03-16 PROCEDURE — 93010 ELECTROCARDIOGRAM REPORT: CPT

## 2017-03-16 RX ORDER — SODIUM CHLORIDE 9 MG/ML
3 INJECTION INTRAMUSCULAR; INTRAVENOUS; SUBCUTANEOUS EVERY 8 HOURS
Qty: 0 | Refills: 0 | Status: DISCONTINUED | OUTPATIENT
Start: 2017-03-16 | End: 2017-03-31

## 2017-03-16 NOTE — H&P CARDIOLOGY - HISTORY OF PRESENT ILLNESS
55 y/o F with PMHx of HTN, HLD,GERD presents for cardiac cath. Pt states the palpitations with chest tightness for about 2 years on & off, admitted to [facility] in 2/2017, had 2 negative Troponin, Echo showed severe MR.       Echo on 2/1/2017:  Systolic anterior motion of the mitral valve. Severe MR with an eccentric, posteriorly directed jet.  Vena contracta width about  0.8 cm. Mild AR. EF 73%. No segmental  wall motion abnormalities. Peak left ventricular outflow tract gradient equals 170 mm Hg, mean gradient is equal to 66 mm Hg, consistent with severe LVOT obstruction.  A small  (about  4 mm), linear echodensity is visualized emanating from the proximal interventricular septum and may be consistent with a small subaortic membrane. 55 y/o F with PMHx of HTN, HLD,GERD presents for cardiac cath. Pt states the palpitations with chest tightness for about 2 years on & off, admitted to [facility] in 2/2017, had 2 negative Troponin, Echo showed severe MR, evaluated by Dr. Sharma, scheduled for Mitral valve surgery on 3/23.       Echo on 2/1/2017:  Systolic anterior motion of the mitral valve. Severe MR with an eccentric, posteriorly directed jet.  Vena contracta width about  0.8 cm. Mild AR. EF 73%. No segmental  wall motion abnormalities. Peak left ventricular outflow tract gradient equals 170 mm Hg, mean gradient is equal to 66 mm Hg, consistent with severe LVOT obstruction.  A small  (about  4 mm), linear echodensity is visualized emanating from the proximal interventricular septum and may be consistent with a small subaortic membrane.

## 2017-03-16 NOTE — H&P CARDIOLOGY - PMH
GERD (gastroesophageal reflux disease)    HLD (hyperlipidemia)    HTN (hypertension)    MR (mitral regurgitation)

## 2017-03-16 NOTE — H&P CARDIOLOGY - FAMILY HISTORY
No pertinent family history in first degree relatives Father  Still living? No  Family history of heart disease, Age at diagnosis: Age Unknown     Mother  Still living? Unknown  Family history of hypertension, Age at diagnosis: Age Unknown     Sibling  Still living? Yes, Estimated age: Age Unknown  Family history of hypertension, Age at diagnosis: Age Unknown

## 2017-03-22 ENCOUNTER — APPOINTMENT (OUTPATIENT)
Dept: ULTRASOUND IMAGING | Facility: HOSPITAL | Age: 57
End: 2017-03-22

## 2017-03-22 ENCOUNTER — OUTPATIENT (OUTPATIENT)
Dept: OUTPATIENT SERVICES | Facility: HOSPITAL | Age: 57
LOS: 1 days | End: 2017-03-22
Payer: COMMERCIAL

## 2017-03-22 ENCOUNTER — APPOINTMENT (OUTPATIENT)
Dept: PULMONOLOGY | Facility: CLINIC | Age: 57
End: 2017-03-22

## 2017-03-22 VITALS
WEIGHT: 147.27 LBS | DIASTOLIC BLOOD PRESSURE: 90 MMHG | TEMPERATURE: 99 F | SYSTOLIC BLOOD PRESSURE: 160 MMHG | HEIGHT: 64 IN | RESPIRATION RATE: 16 BRPM | HEART RATE: 56 BPM | OXYGEN SATURATION: 97 %

## 2017-03-22 DIAGNOSIS — K08.89 OTHER SPECIFIED DISORDERS OF TEETH AND SUPPORTING STRUCTURES: ICD-10-CM

## 2017-03-22 DIAGNOSIS — Z01.818 ENCOUNTER FOR OTHER PREPROCEDURAL EXAMINATION: ICD-10-CM

## 2017-03-22 DIAGNOSIS — I34.0 NONRHEUMATIC MITRAL (VALVE) INSUFFICIENCY: ICD-10-CM

## 2017-03-22 DIAGNOSIS — I10 ESSENTIAL (PRIMARY) HYPERTENSION: ICD-10-CM

## 2017-03-22 PROCEDURE — 87641 MR-STAPH DNA AMP PROBE: CPT

## 2017-03-22 PROCEDURE — 86850 RBC ANTIBODY SCREEN: CPT

## 2017-03-22 PROCEDURE — 83036 HEMOGLOBIN GLYCOSYLATED A1C: CPT

## 2017-03-22 PROCEDURE — 85027 COMPLETE CBC AUTOMATED: CPT

## 2017-03-22 PROCEDURE — 86900 BLOOD TYPING SEROLOGIC ABO: CPT

## 2017-03-22 PROCEDURE — 86901 BLOOD TYPING SEROLOGIC RH(D): CPT

## 2017-03-22 PROCEDURE — 71020: CPT | Mod: 26

## 2017-03-22 PROCEDURE — 93880 EXTRACRANIAL BILAT STUDY: CPT | Mod: 26

## 2017-03-22 PROCEDURE — 93880 EXTRACRANIAL BILAT STUDY: CPT

## 2017-03-22 PROCEDURE — 71046 X-RAY EXAM CHEST 2 VIEWS: CPT

## 2017-03-22 PROCEDURE — 87640 STAPH A DNA AMP PROBE: CPT

## 2017-03-22 PROCEDURE — 80048 BASIC METABOLIC PNL TOTAL CA: CPT

## 2017-03-22 PROCEDURE — G0463: CPT

## 2017-03-22 RX ORDER — CEFUROXIME AXETIL 250 MG
1500 TABLET ORAL ONCE
Qty: 0 | Refills: 0 | Status: COMPLETED | OUTPATIENT
Start: 2017-05-04 | End: 2017-05-04

## 2017-03-22 NOTE — H&P PST ADULT - RS GEN PE MLT RESP DETAILS PC
good air movement/clear to auscultation bilaterally/normal/no chest wall tenderness/breath sounds equal/respirations non-labored/airway patent

## 2017-03-22 NOTE — H&P PST ADULT - ACTIVITY
pt is active at work ,house work ,walk daily 1-2 miles ,to take 1-2 flight of stairs without any distress

## 2017-03-22 NOTE — H&P PST ADULT - LYMPHATIC
supraclavicular L/posterior cervical R/posterior cervical L/supraclavicular R/anterior cervical L/anterior cervical R

## 2017-03-22 NOTE — H&P PST ADULT - HISTORY OF PRESENT ILLNESS
57 y/o F with PMHx of HTN, HLD,GERD presents for cardiac cath. Pt states the palpitations with chest tightness for about 2 years on & off, admitted to [facility] in 2/2017, had 2 negative Troponin, Echo showed severe MR, evaluated by Dr. Sharma, scheduled for Mitral valve surgery on 3/23.         Echo on 2/1/2017:  Systolic anterior motion of the mitral valve. Severe MR with an eccentric, posteriorly directed jet.  Vena contracta width about  0.8 cm. Mild AR. EF 73%. No segmental  wall motion abnormalities. Peak left ventricular outflow tract gradient equals 170 mm Hg, mean gradient is equal to 66 mm Hg, consistent with severe LVOT obstruction.  A small  (about  4 mm), linear echodensity is visualized emanating from the proximal interventricular septum and may be consistent with a small subaortic membrane. 57 y/o F with PMHx of HTN, HLD,GERD . Pt has been experiencing  palpitations with chest tightness for about 2 years on & off, s/p hospitalization for chest pain  in 2/2017, had 2 negative Troponin, Echo showed severe MR, evaluated by Dr. Sharma, scheduled for Mitral valve surgery on 3/23/2017.     pt has 2  loose teeth Rt lower molar and complaining of tooth ache left wisdom teeth.        . 57 y/o F with PMHx of HTN, HLD,GERD . Pt has been experiencing  palpitations with chest tightness for about 2 years on & off, s/p hospitalization for chest pain  in 2/2017 ,s/p cardiology work up, Echo showed severe MR, evaluated by Dr. Sharma, scheduled for Mitral valve surgery on 3/23/2017.     *pt has 2  loose teeth Rt lower molar and complaining of tooth ache left wisdom teeth.        . 57 y/o F with PMHx of HTN, HLD,GERD . Pt has been experiencing  palpitations with chest tightness for about 2 years on & off, s/p hospitalization for chest pain  in 2/2017 ,s/p cardiology work up, Echo showed severe MR, evaluated by Dr. Sharma, scheduled for Mitral valve surgery on 3/23/2017.     *pt has 2  loose teeth Rt lower molar and complaining of tooth ache left wisdom teeth. 55 y/o F with PMHx of HTN, HLD,GERD . Pt has been experiencing  palpitations with chest tightness for about 2 years on & off, s/p hospitalization for chest pain  in 2/2017 ,s/p cardiology work up, Echo showed severe MR, evaluated by Dr. Sharma, scheduled for Mitral valve surgery on 3/23/2017.     *pt has 2  loose teeth Rt lower pre molar and complaining of tooth ache left 3 rd molar   Spoke to Estefania ALVAREZ ) from Ct surgery sent patient for dental eval. Ct surgery to F/u with patient.

## 2017-03-22 NOTE — H&P PST ADULT - FAMILY HISTORY
Father  Still living? No  Family history of heart disease, Age at diagnosis: Age Unknown     Mother  Still living? Unknown  Family history of hypertension, Age at diagnosis: Age Unknown     Sibling  Still living? Yes, Estimated age: Age Unknown  Family history of hypertension, Age at diagnosis: Age Unknown

## 2017-03-22 NOTE — H&P PST ADULT - ABILITY TO HEAR (WITH HEARING AID OR HEARING APPLIANCE IF NORMALLY USED):
Adequate: hears normal conversation without difficulty [place] RT ear/Mildly to Moderately Impaired: difficulty hearing in some environments or speaker may need to increase volume or speak distinctly

## 2017-03-22 NOTE — H&P PST ADULT - MALLAMPATI CLASS
15 inch neck/Class II - visualization of the soft palate, fauces, and uvula Class III - visualization of the soft palate and the base of the uvula/15 inch neck

## 2017-03-22 NOTE — H&P PST ADULT - PROBLEM SELECTOR PLAN 3
Called  CT surgery and spoke to  Estefania (NP )  Advised to go for dental eval Called  CT surgery and spoke to  Estefania (NP )  Advised to go for dental eval Ct surgery will Follow up

## 2017-03-22 NOTE — H&P PST ADULT - NSANTHOSAYNRD_GEN_A_CORE
No. KATHERYN screening performed.  STOP BANG Legend: 0-2 = LOW Risk; 3-4 = INTERMEDIATE Risk; 5-8 = HIGH Risk

## 2017-03-22 NOTE — H&P PST ADULT - PROBLEM SELECTOR PLAN 1
Mitral Valve Repair  pre- op Instructions given   MRSA /MSSA ,Type and screen ,CBC,HgA1c ,BMP Chest Xray ,Carotid doppler done

## 2017-03-22 NOTE — H&P PST ADULT - PMH
GERD (gastroesophageal reflux disease)    HLD (hyperlipidemia)    HTN (hypertension)    MR (mitral regurgitation) Fatty liver    GERD (gastroesophageal reflux disease)    HLD (hyperlipidemia)    HTN (hypertension)    Mitral valve insufficiency    MR (mitral regurgitation)

## 2017-03-28 ENCOUNTER — EMERGENCY (EMERGENCY)
Facility: HOSPITAL | Age: 57
LOS: 1 days | Discharge: HOME | End: 2017-03-28
Attending: EMERGENCY MEDICINE | Admitting: EMERGENCY MEDICINE
Payer: COMMERCIAL

## 2017-03-28 VITALS
TEMPERATURE: 98 F | HEART RATE: 62 BPM | WEIGHT: 147.05 LBS | DIASTOLIC BLOOD PRESSURE: 93 MMHG | RESPIRATION RATE: 16 BRPM | OXYGEN SATURATION: 99 % | SYSTOLIC BLOOD PRESSURE: 177 MMHG | HEIGHT: 61 IN

## 2017-03-28 DIAGNOSIS — K08.89 OTHER SPECIFIED DISORDERS OF TEETH AND SUPPORTING STRUCTURES: ICD-10-CM

## 2017-03-28 DIAGNOSIS — I10 ESSENTIAL (PRIMARY) HYPERTENSION: ICD-10-CM

## 2017-03-28 DIAGNOSIS — E78.5 HYPERLIPIDEMIA, UNSPECIFIED: ICD-10-CM

## 2017-03-28 DIAGNOSIS — K21.9 GASTRO-ESOPHAGEAL REFLUX DISEASE WITHOUT ESOPHAGITIS: ICD-10-CM

## 2017-03-28 PROCEDURE — 64400 NJX AA&/STRD TRIGEMINAL NRV: CPT | Mod: LT

## 2017-03-28 PROCEDURE — 99283 EMERGENCY DEPT VISIT LOW MDM: CPT | Mod: 25

## 2017-03-28 PROCEDURE — 64400 NJX AA&/STRD TRIGEMINAL NRV: CPT | Mod: 26

## 2017-03-28 RX ORDER — POLYETHYLENE GLYCOL 3350 17 G/17G
17 POWDER, FOR SOLUTION ORAL
Qty: 119 | Refills: 0 | OUTPATIENT
Start: 2017-03-28 | End: 2017-04-04

## 2017-03-28 RX ORDER — OXYCODONE HYDROCHLORIDE 5 MG/1
1 TABLET ORAL
Qty: 20 | Refills: 0 | OUTPATIENT
Start: 2017-03-28 | End: 2017-04-02

## 2017-03-28 RX ORDER — ACETAMINOPHEN 500 MG
975 TABLET ORAL ONCE
Qty: 0 | Refills: 0 | Status: COMPLETED | OUTPATIENT
Start: 2017-03-28 | End: 2017-03-28

## 2017-03-28 RX ORDER — IBUPROFEN 200 MG
600 TABLET ORAL ONCE
Qty: 0 | Refills: 0 | Status: COMPLETED | OUTPATIENT
Start: 2017-03-28 | End: 2017-03-28

## 2017-03-28 RX ORDER — SENNA PLUS 8.6 MG/1
2 TABLET ORAL
Qty: 10 | Refills: 0 | OUTPATIENT
Start: 2017-03-28 | End: 2017-04-02

## 2017-03-28 RX ORDER — OXYCODONE HYDROCHLORIDE 5 MG/1
5 TABLET ORAL ONCE
Qty: 0 | Refills: 0 | Status: DISCONTINUED | OUTPATIENT
Start: 2017-03-28 | End: 2017-03-28

## 2017-03-28 RX ORDER — ACETAMINOPHEN 500 MG
2 TABLET ORAL
Qty: 56 | Refills: 0 | OUTPATIENT
Start: 2017-03-28 | End: 2017-04-04

## 2017-03-28 RX ORDER — ACETAMINOPHEN 500 MG
1000 TABLET ORAL ONCE
Qty: 0 | Refills: 0 | Status: DISCONTINUED | OUTPATIENT
Start: 2017-03-28 | End: 2017-03-28

## 2017-03-28 RX ORDER — IBUPROFEN 200 MG
1 TABLET ORAL
Qty: 28 | Refills: 0 | OUTPATIENT
Start: 2017-03-28 | End: 2017-04-04

## 2017-03-28 RX ORDER — BUPIVACAINE HCL/PF 7.5 MG/ML
5 VIAL (ML) INJECTION ONCE
Qty: 0 | Refills: 0 | Status: DISCONTINUED | OUTPATIENT
Start: 2017-03-28 | End: 2017-04-01

## 2017-03-28 RX ADMIN — Medication 975 MILLIGRAM(S): at 09:25

## 2017-03-28 RX ADMIN — Medication 600 MILLIGRAM(S): at 10:53

## 2017-03-28 RX ADMIN — Medication 600 MILLIGRAM(S): at 09:25

## 2017-03-28 RX ADMIN — OXYCODONE HYDROCHLORIDE 5 MILLIGRAM(S): 5 TABLET ORAL at 09:25

## 2017-03-28 RX ADMIN — Medication 975 MILLIGRAM(S): at 10:53

## 2017-03-28 RX ADMIN — OXYCODONE HYDROCHLORIDE 5 MILLIGRAM(S): 5 TABLET ORAL at 10:54

## 2017-03-28 NOTE — ED PROVIDER NOTE - NS ED ROS FT
No fever, no chills, no change in vision, no throat pain, no chest pain, no abdominal pain, no joint pain, no rashes, no focal neurologic complaints,  all ROS otherwise as per HPI or negative.

## 2017-03-28 NOTE — ED ADULT NURSE NOTE - PMH
Fatty liver    GERD (gastroesophageal reflux disease)    HLD (hyperlipidemia)    HTN (hypertension)    Mitral valve insufficiency    MR (mitral regurgitation)

## 2017-03-28 NOTE — ED PROVIDER NOTE - PHYSICAL EXAMINATION
NAD, NCAT, MMM, Trachea midline, Normal conjunctiva, CTAB, Non-tachycardic, Normal perfusion, Soft, NTND, No rebound/guarding, No edema, No deformity of extremities, Appropriate, Cooperative, With capacity and insight, No rashes, CN grossly intact, Normal coordination, No focal motor or sensory deficits, normal TM bilaterally, no dental abscess, no facial swelling

## 2017-03-28 NOTE — ED PROVIDER NOTE - OBJECTIVE STATEMENT
Patient with left jaw pain x 2 weeks moderate. Persistent. Patient saw then dentist last week and states that when she saw the dentist and that the (18th) back left lower tooth will need to come out. Patient had a canceled valve surgery 3/24 secondary to this dental issue as the surgeon would not operate until the tooth is removed surgery scheduled for 4/5. Moderate to severe. Persistent. Not better with 1 Aleve a day.

## 2017-03-28 NOTE — ED PROVIDER NOTE - CARE PLAN
Principal Discharge DX:	Tooth pain  Instructions for follow-up, activity and diet:	Follow up with your medical doctor in 2-3 days or call our clinic at [phone] and state you were seen in the Emergency Department and would like to be seen in clinic.   Take Tylenol 1g every six hours and supplement with ibuprofen 600mg, with food, every six hours which can be taken three hours apart from the Tylenol to have a layered effect.  Drink at least 2 Liters or 64 Ounces of water each day.  Return for any persistent, worsening symptoms, or ANY concerns at all.

## 2017-03-28 NOTE — ED PROVIDER NOTE - MEDICAL DECISION MAKING DETAILS
Will get analgesia, no infection, patient has appointment with dental surgeon 4/5 for extraction, will treat pain until appointment. Will encourage patient to Take Tylenol 1g every six hours and supplement with ibuprofen 600mg, with food, every six hours which can be taken three hours apart from the Tylenol to have a layered effect with oxycodone for rescue pain.

## 2017-03-28 NOTE — ED PROVIDER NOTE - PLAN OF CARE
Follow up with your medical doctor in 2-3 days or call our clinic at [phone] and state you were seen in the Emergency Department and would like to be seen in clinic.   Take Tylenol 1g every six hours and supplement with ibuprofen 600mg, with food, every six hours which can be taken three hours apart from the Tylenol to have a layered effect.  Drink at least 2 Liters or 64 Ounces of water each day.  Return for any persistent, worsening symptoms, or ANY concerns at all.

## 2017-03-28 NOTE — ED ADULT NURSE NOTE - CHPI ED SYMPTOMS NEG
no fever/no nausea/no tingling/no weakness/no chills/no numbness/no vomiting/no decreased eating/drinking/no dizziness

## 2017-03-28 NOTE — ED PROCEDURE NOTE - DETAILS:
***Alfredo Carrasco MD*** Attending physician was available for the key components of the procedure, the patient tolerated well. There were no complications with the procedure.

## 2017-04-21 PROCEDURE — 93456 R HRT CORONARY ARTERY ANGIO: CPT

## 2017-04-21 PROCEDURE — C1769: CPT

## 2017-04-21 PROCEDURE — 80053 COMPREHEN METABOLIC PANEL: CPT

## 2017-04-21 PROCEDURE — 85027 COMPLETE CBC AUTOMATED: CPT

## 2017-04-21 PROCEDURE — 76937 US GUIDE VASCULAR ACCESS: CPT

## 2017-04-21 PROCEDURE — C1887: CPT

## 2017-04-21 PROCEDURE — 93005 ELECTROCARDIOGRAM TRACING: CPT

## 2017-04-21 PROCEDURE — C1894: CPT

## 2017-04-27 ENCOUNTER — OUTPATIENT (OUTPATIENT)
Dept: OUTPATIENT SERVICES | Facility: HOSPITAL | Age: 57
LOS: 1 days | End: 2017-04-27
Payer: COMMERCIAL

## 2017-04-27 VITALS
TEMPERATURE: 97 F | WEIGHT: 149.91 LBS | RESPIRATION RATE: 20 BRPM | HEART RATE: 64 BPM | HEIGHT: 64 IN | SYSTOLIC BLOOD PRESSURE: 172 MMHG | DIASTOLIC BLOOD PRESSURE: 90 MMHG | OXYGEN SATURATION: 99 %

## 2017-04-27 DIAGNOSIS — Z01.818 ENCOUNTER FOR OTHER PREPROCEDURAL EXAMINATION: ICD-10-CM

## 2017-04-27 DIAGNOSIS — I34.0 NONRHEUMATIC MITRAL (VALVE) INSUFFICIENCY: ICD-10-CM

## 2017-04-27 LAB
ALBUMIN SERPL ELPH-MCNC: 4.2 G/DL — SIGNIFICANT CHANGE UP (ref 3.3–5)
ALP SERPL-CCNC: 91 U/L — SIGNIFICANT CHANGE UP (ref 40–120)
ALT FLD-CCNC: 21 U/L — SIGNIFICANT CHANGE UP (ref 10–45)
ANION GAP SERPL CALC-SCNC: 11 MMOL/L — SIGNIFICANT CHANGE UP (ref 5–17)
AST SERPL-CCNC: 32 U/L — SIGNIFICANT CHANGE UP (ref 10–40)
BILIRUB DIRECT SERPL-MCNC: 0.1 MG/DL — SIGNIFICANT CHANGE UP (ref 0–0.2)
BILIRUB INDIRECT FLD-MCNC: 0.2 MG/DL — SIGNIFICANT CHANGE UP (ref 0.2–1)
BILIRUB SERPL-MCNC: 0.2 MG/DL — SIGNIFICANT CHANGE UP (ref 0.2–1.2)
BLD GP AB SCN SERPL QL: NEGATIVE — SIGNIFICANT CHANGE UP
BUN SERPL-MCNC: 19 MG/DL — SIGNIFICANT CHANGE UP (ref 7–23)
CALCIUM SERPL-MCNC: 10.2 MG/DL — SIGNIFICANT CHANGE UP (ref 8.4–10.5)
CHLORIDE SERPL-SCNC: 103 MMOL/L — SIGNIFICANT CHANGE UP (ref 96–108)
CO2 SERPL-SCNC: 25 MMOL/L — SIGNIFICANT CHANGE UP (ref 22–31)
CREAT SERPL-MCNC: 0.84 MG/DL — SIGNIFICANT CHANGE UP (ref 0.5–1.3)
GLUCOSE SERPL-MCNC: 79 MG/DL — SIGNIFICANT CHANGE UP (ref 70–99)
HBA1C BLD-MCNC: 6 % — HIGH (ref 4–5.6)
HCT VFR BLD CALC: 36.8 % — SIGNIFICANT CHANGE UP (ref 34.5–45)
HGB BLD-MCNC: 11.4 G/DL — LOW (ref 11.5–15.5)
MCHC RBC-ENTMCNC: 24.4 PG — LOW (ref 27–34)
MCHC RBC-ENTMCNC: 31 GM/DL — LOW (ref 32–36)
MCV RBC AUTO: 78.8 FL — LOW (ref 80–100)
PLATELET # BLD AUTO: 394 K/UL — SIGNIFICANT CHANGE UP (ref 150–400)
POTASSIUM SERPL-MCNC: 4.3 MMOL/L — SIGNIFICANT CHANGE UP (ref 3.5–5.3)
POTASSIUM SERPL-SCNC: 4.3 MMOL/L — SIGNIFICANT CHANGE UP (ref 3.5–5.3)
PROT SERPL-MCNC: 8.8 G/DL — HIGH (ref 6–8.3)
RBC # BLD: 4.67 M/UL — SIGNIFICANT CHANGE UP (ref 3.8–5.2)
RBC # FLD: 16.9 % — HIGH (ref 10.3–14.5)
RH IG SCN BLD-IMP: NEGATIVE — SIGNIFICANT CHANGE UP
SODIUM SERPL-SCNC: 139 MMOL/L — SIGNIFICANT CHANGE UP (ref 135–145)
WBC # BLD: 4.42 K/UL — SIGNIFICANT CHANGE UP (ref 3.8–10.5)
WBC # FLD AUTO: 4.42 K/UL — SIGNIFICANT CHANGE UP (ref 3.8–10.5)

## 2017-04-27 PROCEDURE — G0463: CPT

## 2017-04-27 PROCEDURE — 87640 STAPH A DNA AMP PROBE: CPT

## 2017-04-27 PROCEDURE — 86901 BLOOD TYPING SEROLOGIC RH(D): CPT

## 2017-04-27 PROCEDURE — 83036 HEMOGLOBIN GLYCOSYLATED A1C: CPT

## 2017-04-27 PROCEDURE — 80076 HEPATIC FUNCTION PANEL: CPT

## 2017-04-27 PROCEDURE — 86850 RBC ANTIBODY SCREEN: CPT

## 2017-04-27 PROCEDURE — 85027 COMPLETE CBC AUTOMATED: CPT

## 2017-04-27 PROCEDURE — 86900 BLOOD TYPING SEROLOGIC ABO: CPT

## 2017-04-27 PROCEDURE — 80048 BASIC METABOLIC PNL TOTAL CA: CPT

## 2017-04-27 RX ORDER — CEFUROXIME AXETIL 250 MG
1500 TABLET ORAL ONCE
Qty: 0 | Refills: 0 | Status: COMPLETED | OUTPATIENT
Start: 2017-05-04 | End: 2017-05-04

## 2017-04-27 NOTE — H&P PST ADULT - HISTORY OF PRESENT ILLNESS
57 y/o F PMH 55 y/o F PMH angina with exertion, was previously scheduled for MVR in March, but was cancelled due to tooth abscess. The patient went to her dentist and had treatment, finished her amoxicillin today.  Presents today for mitral valve repair/replacement and septal myomectomy. 57 y/o F PMH dyspnea and angina with exertion over the past 2 years, was previously scheduled for MVR in March, but was cancelled due to tooth abscess. The patient went to her dentist and had treatment, finished her amoxicillin today.  Presents today for mitral valve repair/replacement and septal myomectomy.

## 2017-04-27 NOTE — H&P PST ADULT - PROBLEM SELECTOR PLAN 1
Mitral valve repair/replacement, septal myomectomy.  Was seen by the dentist for loose teeth and tooth pain, treated with amoxicillin.

## 2017-04-28 LAB
MRSA PCR RESULT.: SIGNIFICANT CHANGE UP
S AUREUS DNA NOSE QL NAA+PROBE: SIGNIFICANT CHANGE UP

## 2017-05-03 ENCOUNTER — OUTPATIENT (OUTPATIENT)
Dept: OUTPATIENT SERVICES | Facility: HOSPITAL | Age: 57
LOS: 1 days | End: 2017-05-03
Payer: COMMERCIAL

## 2017-05-04 ENCOUNTER — RESULT REVIEW (OUTPATIENT)
Age: 57
End: 2017-05-04

## 2017-05-04 ENCOUNTER — TRANSCRIPTION ENCOUNTER (OUTPATIENT)
Age: 57
End: 2017-05-04

## 2017-05-04 ENCOUNTER — INPATIENT (INPATIENT)
Facility: HOSPITAL | Age: 57
LOS: 3 days | Discharge: HOME | DRG: 220 | End: 2017-05-08
Payer: COMMERCIAL

## 2017-05-04 ENCOUNTER — APPOINTMENT (OUTPATIENT)
Dept: CARDIOTHORACIC SURGERY | Facility: HOSPITAL | Age: 57
End: 2017-05-04

## 2017-05-04 VITALS
HEART RATE: 64 BPM | SYSTOLIC BLOOD PRESSURE: 157 MMHG | TEMPERATURE: 99 F | RESPIRATION RATE: 20 BRPM | WEIGHT: 149.47 LBS | DIASTOLIC BLOOD PRESSURE: 88 MMHG | OXYGEN SATURATION: 99 % | HEIGHT: 64 IN

## 2017-05-04 DIAGNOSIS — I34.0 NONRHEUMATIC MITRAL (VALVE) INSUFFICIENCY: ICD-10-CM

## 2017-05-04 LAB
ALBUMIN SERPL ELPH-MCNC: 3.4 G/DL — SIGNIFICANT CHANGE UP (ref 3.3–5)
ALP SERPL-CCNC: 32 U/L — LOW (ref 40–120)
ALT FLD-CCNC: 13 U/L RC — SIGNIFICANT CHANGE UP (ref 10–45)
ANION GAP SERPL CALC-SCNC: 14 MMOL/L — SIGNIFICANT CHANGE UP (ref 5–17)
APTT BLD: 46.8 SEC — HIGH (ref 27.5–37.4)
AST SERPL-CCNC: 40 U/L — SIGNIFICANT CHANGE UP (ref 10–40)
BASOPHILS # BLD AUTO: 0 K/UL — SIGNIFICANT CHANGE UP (ref 0–0.2)
BASOPHILS NFR BLD AUTO: 0.2 % — SIGNIFICANT CHANGE UP (ref 0–2)
BILIRUB SERPL-MCNC: 1.2 MG/DL — SIGNIFICANT CHANGE UP (ref 0.2–1.2)
BUN SERPL-MCNC: 12 MG/DL — SIGNIFICANT CHANGE UP (ref 7–23)
CALCIUM SERPL-MCNC: 8.6 MG/DL — SIGNIFICANT CHANGE UP (ref 8.4–10.5)
CHLORIDE SERPL-SCNC: 106 MMOL/L — SIGNIFICANT CHANGE UP (ref 96–108)
CK MB BLD-MCNC: 11.1 % — HIGH (ref 0–3.5)
CK MB CFR SERPL CALC: 43.8 NG/ML — HIGH (ref 0–3.8)
CK SERPL-CCNC: 393 U/L — HIGH (ref 25–170)
CO2 SERPL-SCNC: 21 MMOL/L — LOW (ref 22–31)
CREAT SERPL-MCNC: 0.51 MG/DL — SIGNIFICANT CHANGE UP (ref 0.5–1.3)
EOSINOPHIL # BLD AUTO: 0.1 K/UL — SIGNIFICANT CHANGE UP (ref 0–0.5)
EOSINOPHIL NFR BLD AUTO: 1 % — SIGNIFICANT CHANGE UP (ref 0–6)
FIBRINOGEN PPP-MCNC: 224 MG/DL — LOW (ref 310–510)
GAS PNL BLDA: SIGNIFICANT CHANGE UP
GLUCOSE SERPL-MCNC: 135 MG/DL — HIGH (ref 70–99)
HCT VFR BLD CALC: 28.4 % — LOW (ref 34.5–45)
HGB BLD-MCNC: 9.5 G/DL — LOW (ref 11.5–15.5)
INR BLD: 1.85 RATIO — HIGH (ref 0.88–1.16)
LYMPHOCYTES # BLD AUTO: 1.4 K/UL — SIGNIFICANT CHANGE UP (ref 1–3.3)
LYMPHOCYTES # BLD AUTO: 16.4 % — SIGNIFICANT CHANGE UP (ref 13–44)
MCHC RBC-ENTMCNC: 26.9 PG — LOW (ref 27–34)
MCHC RBC-ENTMCNC: 33.4 GM/DL — SIGNIFICANT CHANGE UP (ref 32–36)
MCV RBC AUTO: 80.7 FL — SIGNIFICANT CHANGE UP (ref 80–100)
MONOCYTES # BLD AUTO: 0.3 K/UL — SIGNIFICANT CHANGE UP (ref 0–0.9)
MONOCYTES NFR BLD AUTO: 3.1 % — SIGNIFICANT CHANGE UP (ref 2–14)
NEUTROPHILS # BLD AUTO: 6.9 K/UL — SIGNIFICANT CHANGE UP (ref 1.8–7.4)
NEUTROPHILS NFR BLD AUTO: 79.3 % — HIGH (ref 43–77)
PLATELET # BLD AUTO: 101 K/UL — LOW (ref 150–400)
POTASSIUM SERPL-MCNC: 4 MMOL/L — SIGNIFICANT CHANGE UP (ref 3.5–5.3)
POTASSIUM SERPL-SCNC: 4 MMOL/L — SIGNIFICANT CHANGE UP (ref 3.5–5.3)
PROT SERPL-MCNC: 5 G/DL — LOW (ref 6–8.3)
PROTHROM AB SERPL-ACNC: 20.4 SEC — HIGH (ref 9.8–12.7)
RBC # BLD: 3.52 M/UL — LOW (ref 3.8–5.2)
RBC # FLD: 15.9 % — HIGH (ref 10.3–14.5)
SODIUM SERPL-SCNC: 141 MMOL/L — SIGNIFICANT CHANGE UP (ref 135–145)
TROPONIN T SERPL-MCNC: 1.91 NG/ML — HIGH (ref 0–0.06)
WBC # BLD: 8.7 K/UL — SIGNIFICANT CHANGE UP (ref 3.8–10.5)
WBC # FLD AUTO: 8.7 K/UL — SIGNIFICANT CHANGE UP (ref 3.8–10.5)

## 2017-05-04 PROCEDURE — 88305 TISSUE EXAM BY PATHOLOGIST: CPT | Mod: 26

## 2017-05-04 PROCEDURE — 33427 REPAIR OF MITRAL VALVE: CPT

## 2017-05-04 PROCEDURE — 71010: CPT | Mod: 26

## 2017-05-04 PROCEDURE — 33120 EXC ICAR TUM RESC W/CARD BYP: CPT

## 2017-05-04 PROCEDURE — 93010 ELECTROCARDIOGRAM REPORT: CPT

## 2017-05-04 RX ORDER — SODIUM CHLORIDE 9 MG/ML
3 INJECTION INTRAMUSCULAR; INTRAVENOUS; SUBCUTANEOUS EVERY 8 HOURS
Qty: 0 | Refills: 0 | Status: DISCONTINUED | OUTPATIENT
Start: 2017-05-04 | End: 2017-05-04

## 2017-05-04 RX ORDER — DEXTROSE 50 % IN WATER 50 %
1 SYRINGE (ML) INTRAVENOUS ONCE
Qty: 0 | Refills: 0 | Status: DISCONTINUED | OUTPATIENT
Start: 2017-05-04 | End: 2017-05-08

## 2017-05-04 RX ORDER — DOCUSATE SODIUM 100 MG
100 CAPSULE ORAL THREE TIMES A DAY
Qty: 0 | Refills: 0 | Status: DISCONTINUED | OUTPATIENT
Start: 2017-05-04 | End: 2017-05-08

## 2017-05-04 RX ORDER — DEXTROSE 50 % IN WATER 50 %
25 SYRINGE (ML) INTRAVENOUS ONCE
Qty: 0 | Refills: 0 | Status: DISCONTINUED | OUTPATIENT
Start: 2017-05-04 | End: 2017-05-08

## 2017-05-04 RX ORDER — SODIUM CHLORIDE 9 MG/ML
1000 INJECTION, SOLUTION INTRAVENOUS
Qty: 0 | Refills: 0 | Status: DISCONTINUED | OUTPATIENT
Start: 2017-05-04 | End: 2017-05-08

## 2017-05-04 RX ORDER — METOPROLOL TARTRATE 50 MG
5 TABLET ORAL ONCE
Qty: 0 | Refills: 0 | Status: COMPLETED | OUTPATIENT
Start: 2017-05-04 | End: 2017-05-04

## 2017-05-04 RX ORDER — ALBUMIN HUMAN 25 %
250 VIAL (ML) INTRAVENOUS
Qty: 0 | Refills: 0 | Status: COMPLETED | OUTPATIENT
Start: 2017-05-04 | End: 2017-05-04

## 2017-05-04 RX ORDER — ALBUMIN HUMAN 25 %
250 VIAL (ML) INTRAVENOUS ONCE
Qty: 0 | Refills: 0 | Status: COMPLETED | OUTPATIENT
Start: 2017-05-04 | End: 2017-05-04

## 2017-05-04 RX ORDER — INSULIN HUMAN 100 [IU]/ML
3 INJECTION, SOLUTION SUBCUTANEOUS
Qty: 100 | Refills: 0 | Status: DISCONTINUED | OUTPATIENT
Start: 2017-05-04 | End: 2017-05-05

## 2017-05-04 RX ORDER — ESMOLOL HCL 100MG/10ML
50 VIAL (ML) INTRAVENOUS
Qty: 2500 | Refills: 0 | Status: DISCONTINUED | OUTPATIENT
Start: 2017-05-04 | End: 2017-05-06

## 2017-05-04 RX ORDER — METOPROLOL TARTRATE 50 MG
7.5 TABLET ORAL ONCE
Qty: 0 | Refills: 0 | Status: COMPLETED | OUTPATIENT
Start: 2017-05-04 | End: 2017-05-04

## 2017-05-04 RX ORDER — CEFUROXIME AXETIL 250 MG
1500 TABLET ORAL EVERY 8 HOURS
Qty: 0 | Refills: 0 | Status: COMPLETED | OUTPATIENT
Start: 2017-05-04 | End: 2017-05-06

## 2017-05-04 RX ORDER — PHENYLEPHRINE HYDROCHLORIDE 10 MG/ML
0.2 INJECTION INTRAVENOUS
Qty: 80 | Refills: 0 | Status: DISCONTINUED | OUTPATIENT
Start: 2017-05-04 | End: 2017-05-06

## 2017-05-04 RX ORDER — SODIUM CHLORIDE 9 MG/ML
1000 INJECTION INTRAMUSCULAR; INTRAVENOUS; SUBCUTANEOUS
Qty: 0 | Refills: 0 | Status: DISCONTINUED | OUTPATIENT
Start: 2017-05-04 | End: 2017-05-08

## 2017-05-04 RX ORDER — ASPIRIN/CALCIUM CARB/MAGNESIUM 324 MG
81 TABLET ORAL DAILY
Qty: 0 | Refills: 0 | Status: DISCONTINUED | OUTPATIENT
Start: 2017-05-04 | End: 2017-05-08

## 2017-05-04 RX ORDER — POTASSIUM CHLORIDE 20 MEQ
10 PACKET (EA) ORAL
Qty: 0 | Refills: 0 | Status: COMPLETED | OUTPATIENT
Start: 2017-05-04 | End: 2017-05-04

## 2017-05-04 RX ORDER — GLUCAGON INJECTION, SOLUTION 0.5 MG/.1ML
1 INJECTION, SOLUTION SUBCUTANEOUS ONCE
Qty: 0 | Refills: 0 | Status: DISCONTINUED | OUTPATIENT
Start: 2017-05-04 | End: 2017-05-08

## 2017-05-04 RX ORDER — DEXMEDETOMIDINE HYDROCHLORIDE IN 0.9% SODIUM CHLORIDE 4 UG/ML
0.1 INJECTION INTRAVENOUS
Qty: 200 | Refills: 0 | Status: DISCONTINUED | OUTPATIENT
Start: 2017-05-04 | End: 2017-05-05

## 2017-05-04 RX ORDER — MEPERIDINE HYDROCHLORIDE 50 MG/ML
25 INJECTION INTRAMUSCULAR; INTRAVENOUS; SUBCUTANEOUS ONCE
Qty: 0 | Refills: 0 | Status: DISCONTINUED | OUTPATIENT
Start: 2017-05-04 | End: 2017-05-06

## 2017-05-04 RX ORDER — POTASSIUM CHLORIDE 20 MEQ
10 PACKET (EA) ORAL ONCE
Qty: 0 | Refills: 0 | Status: DISCONTINUED | OUTPATIENT
Start: 2017-05-04 | End: 2017-05-06

## 2017-05-04 RX ORDER — METOCLOPRAMIDE HCL 10 MG
10 TABLET ORAL EVERY 8 HOURS
Qty: 0 | Refills: 0 | Status: COMPLETED | OUTPATIENT
Start: 2017-05-04 | End: 2017-05-06

## 2017-05-04 RX ORDER — POTASSIUM CHLORIDE 20 MEQ
10 PACKET (EA) ORAL
Qty: 0 | Refills: 0 | Status: DISCONTINUED | OUTPATIENT
Start: 2017-05-04 | End: 2017-05-06

## 2017-05-04 RX ORDER — FAMOTIDINE 10 MG/ML
20 INJECTION INTRAVENOUS EVERY 12 HOURS
Qty: 0 | Refills: 0 | Status: DISCONTINUED | OUTPATIENT
Start: 2017-05-04 | End: 2017-05-06

## 2017-05-04 RX ORDER — DEXTROSE 50 % IN WATER 50 %
12.5 SYRINGE (ML) INTRAVENOUS ONCE
Qty: 0 | Refills: 0 | Status: DISCONTINUED | OUTPATIENT
Start: 2017-05-04 | End: 2017-05-08

## 2017-05-04 RX ORDER — ALBUMIN HUMAN 25 %
250 VIAL (ML) INTRAVENOUS EVERY 6 HOURS
Qty: 0 | Refills: 0 | Status: DISCONTINUED | OUTPATIENT
Start: 2017-05-04 | End: 2017-05-04

## 2017-05-04 RX ORDER — POTASSIUM CHLORIDE 20 MEQ
10 PACKET (EA) ORAL ONCE
Qty: 0 | Refills: 0 | Status: COMPLETED | OUTPATIENT
Start: 2017-05-04 | End: 2017-05-04

## 2017-05-04 RX ADMIN — Medication 500 MILLILITER(S): at 13:45

## 2017-05-04 RX ADMIN — Medication 100 MILLIEQUIVALENT(S): at 13:00

## 2017-05-04 RX ADMIN — Medication 20.34 MICROGRAM(S)/KG/MIN: at 12:08

## 2017-05-04 RX ADMIN — Medication 100 MILLIGRAM(S): at 16:49

## 2017-05-04 RX ADMIN — Medication 100 MILLIEQUIVALENT(S): at 15:15

## 2017-05-04 RX ADMIN — Medication 100 MILLIEQUIVALENT(S): at 13:45

## 2017-05-04 RX ADMIN — Medication 10 MILLIGRAM(S): at 13:02

## 2017-05-04 RX ADMIN — FAMOTIDINE 20 MILLIGRAM(S): 10 INJECTION INTRAVENOUS at 20:43

## 2017-05-04 RX ADMIN — Medication 10 MILLIGRAM(S): at 22:14

## 2017-05-04 RX ADMIN — Medication 100 MILLIEQUIVALENT(S): at 12:15

## 2017-05-04 RX ADMIN — Medication 100 MILLIEQUIVALENT(S): at 19:30

## 2017-05-04 RX ADMIN — PHENYLEPHRINE HYDROCHLORIDE 5.08 MICROGRAM(S)/KG/MIN: 10 INJECTION INTRAVENOUS at 12:09

## 2017-05-04 RX ADMIN — Medication 7.5 MILLIGRAM(S): at 15:55

## 2017-05-04 RX ADMIN — Medication 100 MILLIEQUIVALENT(S): at 14:00

## 2017-05-04 RX ADMIN — Medication 500 MILLILITER(S): at 13:00

## 2017-05-04 RX ADMIN — Medication 5 MILLIGRAM(S): at 13:02

## 2017-05-04 RX ADMIN — Medication 500 MILLILITER(S): at 13:10

## 2017-05-04 RX ADMIN — Medication 100 MILLIEQUIVALENT(S): at 14:45

## 2017-05-04 RX ADMIN — Medication 7.5 MILLIGRAM(S): at 15:43

## 2017-05-04 RX ADMIN — Medication 500 MILLILITER(S): at 16:28

## 2017-05-04 NOTE — BRIEF OPERATIVE NOTE - PROCEDURE
Septal myectomy  05/04/2017    Active  MARII  Mitral valve repair, open, without replacement  05/04/2017    Active  MARII

## 2017-05-04 NOTE — AIRWAY REMOVAL NOTE  ADULT & PEDS - ARTIFICAL AIRWAY REMOVAL COMMENTS
written order of extubation verified, the pt was identified by full name and birth date compared to the identification band, present during the procedure was RN and MD.

## 2017-05-05 LAB
ALBUMIN SERPL ELPH-MCNC: 4.3 G/DL — SIGNIFICANT CHANGE UP (ref 3.3–5)
ALP SERPL-CCNC: 30 U/L — LOW (ref 40–120)
ALT FLD-CCNC: 15 U/L RC — SIGNIFICANT CHANGE UP (ref 10–45)
ANION GAP SERPL CALC-SCNC: 14 MMOL/L — SIGNIFICANT CHANGE UP (ref 5–17)
AST SERPL-CCNC: 46 U/L — HIGH (ref 10–40)
BILIRUB SERPL-MCNC: 0.8 MG/DL — SIGNIFICANT CHANGE UP (ref 0.2–1.2)
BUN SERPL-MCNC: 14 MG/DL — SIGNIFICANT CHANGE UP (ref 7–23)
CALCIUM SERPL-MCNC: 8.9 MG/DL — SIGNIFICANT CHANGE UP (ref 8.4–10.5)
CHLORIDE SERPL-SCNC: 111 MMOL/L — HIGH (ref 96–108)
CO2 SERPL-SCNC: 19 MMOL/L — LOW (ref 22–31)
CREAT SERPL-MCNC: 0.72 MG/DL — SIGNIFICANT CHANGE UP (ref 0.5–1.3)
GAS PNL BLDA: SIGNIFICANT CHANGE UP
GAS PNL BLDA: SIGNIFICANT CHANGE UP
GLUCOSE SERPL-MCNC: 111 MG/DL — HIGH (ref 70–99)
HCT VFR BLD CALC: 23.8 % — LOW (ref 34.5–45)
HGB BLD-MCNC: 8 G/DL — LOW (ref 11.5–15.5)
MCHC RBC-ENTMCNC: 27.4 PG — SIGNIFICANT CHANGE UP (ref 27–34)
MCHC RBC-ENTMCNC: 33.4 GM/DL — SIGNIFICANT CHANGE UP (ref 32–36)
MCV RBC AUTO: 82.1 FL — SIGNIFICANT CHANGE UP (ref 80–100)
PLATELET # BLD AUTO: 98 K/UL — LOW (ref 150–400)
POTASSIUM SERPL-MCNC: 4.2 MMOL/L — SIGNIFICANT CHANGE UP (ref 3.5–5.3)
POTASSIUM SERPL-SCNC: 4.2 MMOL/L — SIGNIFICANT CHANGE UP (ref 3.5–5.3)
PROT SERPL-MCNC: 6.1 G/DL — SIGNIFICANT CHANGE UP (ref 6–8.3)
RBC # BLD: 2.9 M/UL — LOW (ref 3.8–5.2)
RBC # FLD: 15.9 % — HIGH (ref 10.3–14.5)
SODIUM SERPL-SCNC: 144 MMOL/L — SIGNIFICANT CHANGE UP (ref 135–145)
WBC # BLD: 10.7 K/UL — HIGH (ref 3.8–10.5)
WBC # FLD AUTO: 10.7 K/UL — HIGH (ref 3.8–10.5)

## 2017-05-05 PROCEDURE — 99291 CRITICAL CARE FIRST HOUR: CPT

## 2017-05-05 PROCEDURE — 71010: CPT | Mod: 26

## 2017-05-05 PROCEDURE — 93010 ELECTROCARDIOGRAM REPORT: CPT

## 2017-05-05 RX ORDER — METOPROLOL TARTRATE 50 MG
7.5 TABLET ORAL ONCE
Qty: 0 | Refills: 0 | Status: COMPLETED | OUTPATIENT
Start: 2017-05-05 | End: 2017-05-05

## 2017-05-05 RX ORDER — METOPROLOL TARTRATE 50 MG
5 TABLET ORAL ONCE
Qty: 0 | Refills: 0 | Status: COMPLETED | OUTPATIENT
Start: 2017-05-05 | End: 2017-05-05

## 2017-05-05 RX ORDER — METOPROLOL TARTRATE 50 MG
25 TABLET ORAL DAILY
Qty: 0 | Refills: 0 | Status: DISCONTINUED | OUTPATIENT
Start: 2017-05-05 | End: 2017-05-05

## 2017-05-05 RX ORDER — METOPROLOL TARTRATE 50 MG
25 TABLET ORAL THREE TIMES A DAY
Qty: 0 | Refills: 0 | Status: DISCONTINUED | OUTPATIENT
Start: 2017-05-05 | End: 2017-05-05

## 2017-05-05 RX ORDER — METOPROLOL TARTRATE 50 MG
50 TABLET ORAL EVERY 8 HOURS
Qty: 0 | Refills: 0 | Status: DISCONTINUED | OUTPATIENT
Start: 2017-05-05 | End: 2017-05-06

## 2017-05-05 RX ADMIN — Medication 100 MILLIGRAM(S): at 00:13

## 2017-05-05 RX ADMIN — Medication 81 MILLIGRAM(S): at 12:50

## 2017-05-05 RX ADMIN — Medication 10 MILLIGRAM(S): at 12:50

## 2017-05-05 RX ADMIN — Medication 5 MILLIGRAM(S): at 14:30

## 2017-05-05 RX ADMIN — Medication 10 MILLIGRAM(S): at 05:54

## 2017-05-05 RX ADMIN — Medication 100 MILLIGRAM(S): at 12:49

## 2017-05-05 RX ADMIN — Medication 100 MILLIGRAM(S): at 22:10

## 2017-05-05 RX ADMIN — Medication 100 MILLIGRAM(S): at 18:19

## 2017-05-05 RX ADMIN — Medication 50 MILLIGRAM(S): at 22:10

## 2017-05-05 RX ADMIN — FAMOTIDINE 20 MILLIGRAM(S): 10 INJECTION INTRAVENOUS at 05:54

## 2017-05-05 RX ADMIN — Medication 10 MILLIGRAM(S): at 22:11

## 2017-05-05 RX ADMIN — Medication 7.5 MILLIGRAM(S): at 12:50

## 2017-05-05 RX ADMIN — Medication 100 MILLIGRAM(S): at 08:47

## 2017-05-05 RX ADMIN — Medication 25 MILLIGRAM(S): at 09:53

## 2017-05-05 RX ADMIN — Medication 5 MILLIGRAM(S): at 09:53

## 2017-05-05 RX ADMIN — Medication 25 MILLIGRAM(S): at 08:56

## 2017-05-05 RX ADMIN — Medication 50 MILLIGRAM(S): at 13:24

## 2017-05-05 RX ADMIN — Medication 100 MILLIGRAM(S): at 05:54

## 2017-05-05 RX ADMIN — FAMOTIDINE 20 MILLIGRAM(S): 10 INJECTION INTRAVENOUS at 17:43

## 2017-05-05 NOTE — PHYSICAL THERAPY INITIAL EVALUATION ADULT - PERTINENT HX OF CURRENT PROBLEM, REHAB EVAL
57 y/o F PMH dyspnea and angina with exertion over the past 2 years, was previously scheduled for MVR in March, but was cancelled due to tooth abscess. The patient went to her dentist and had treatment, finished her amoxicillin today.  Presents today for mitral valve repair/replacement and septal myomectomy.

## 2017-05-06 LAB
ANION GAP SERPL CALC-SCNC: 13 MMOL/L — SIGNIFICANT CHANGE UP (ref 5–17)
BUN SERPL-MCNC: 14 MG/DL — SIGNIFICANT CHANGE UP (ref 7–23)
CALCIUM SERPL-MCNC: 9.3 MG/DL — SIGNIFICANT CHANGE UP (ref 8.4–10.5)
CHLORIDE SERPL-SCNC: 102 MMOL/L — SIGNIFICANT CHANGE UP (ref 96–108)
CO2 SERPL-SCNC: 23 MMOL/L — SIGNIFICANT CHANGE UP (ref 22–31)
CREAT SERPL-MCNC: 0.63 MG/DL — SIGNIFICANT CHANGE UP (ref 0.5–1.3)
GLUCOSE SERPL-MCNC: 155 MG/DL — HIGH (ref 70–99)
HCT VFR BLD CALC: 24.1 % — LOW (ref 34.5–45)
HGB BLD-MCNC: 8 G/DL — LOW (ref 11.5–15.5)
INR BLD: 1.47 RATIO — HIGH (ref 0.88–1.16)
MCHC RBC-ENTMCNC: 27.1 PG — SIGNIFICANT CHANGE UP (ref 27–34)
MCHC RBC-ENTMCNC: 33.3 GM/DL — SIGNIFICANT CHANGE UP (ref 32–36)
MCV RBC AUTO: 81.6 FL — SIGNIFICANT CHANGE UP (ref 80–100)
PLATELET # BLD AUTO: 131 K/UL — LOW (ref 150–400)
POTASSIUM SERPL-MCNC: 4.2 MMOL/L — SIGNIFICANT CHANGE UP (ref 3.5–5.3)
POTASSIUM SERPL-SCNC: 4.2 MMOL/L — SIGNIFICANT CHANGE UP (ref 3.5–5.3)
PROTHROM AB SERPL-ACNC: 16 SEC — HIGH (ref 9.8–12.7)
RBC # BLD: 2.96 M/UL — LOW (ref 3.8–5.2)
RBC # FLD: 16.2 % — HIGH (ref 10.3–14.5)
SODIUM SERPL-SCNC: 138 MMOL/L — SIGNIFICANT CHANGE UP (ref 135–145)
TSH SERPL-MCNC: 1.56 UIU/ML — SIGNIFICANT CHANGE UP (ref 0.27–4.2)
WBC # BLD: 22.8 K/UL — HIGH (ref 3.8–10.5)
WBC # FLD AUTO: 22.8 K/UL — HIGH (ref 3.8–10.5)

## 2017-05-06 PROCEDURE — 71010: CPT | Mod: 26

## 2017-05-06 RX ORDER — FAMOTIDINE 10 MG/ML
20 INJECTION INTRAVENOUS
Qty: 0 | Refills: 0 | Status: DISCONTINUED | OUTPATIENT
Start: 2017-05-06 | End: 2017-05-08

## 2017-05-06 RX ORDER — FAMOTIDINE 10 MG/ML
20 INJECTION INTRAVENOUS DAILY
Qty: 0 | Refills: 0 | Status: DISCONTINUED | OUTPATIENT
Start: 2017-05-06 | End: 2017-05-06

## 2017-05-06 RX ORDER — METOPROLOL TARTRATE 50 MG
50 TABLET ORAL EVERY 6 HOURS
Qty: 0 | Refills: 0 | Status: DISCONTINUED | OUTPATIENT
Start: 2017-05-06 | End: 2017-05-08

## 2017-05-06 RX ADMIN — Medication 100 MILLIGRAM(S): at 17:21

## 2017-05-06 RX ADMIN — Medication 50 MILLIGRAM(S): at 12:05

## 2017-05-06 RX ADMIN — Medication 100 MILLIGRAM(S): at 05:48

## 2017-05-06 RX ADMIN — FAMOTIDINE 20 MILLIGRAM(S): 10 INJECTION INTRAVENOUS at 05:49

## 2017-05-06 RX ADMIN — FAMOTIDINE 20 MILLIGRAM(S): 10 INJECTION INTRAVENOUS at 17:21

## 2017-05-06 RX ADMIN — Medication 81 MILLIGRAM(S): at 12:05

## 2017-05-06 RX ADMIN — Medication 100 MILLIGRAM(S): at 23:11

## 2017-05-06 RX ADMIN — Medication 50 MILLIGRAM(S): at 17:21

## 2017-05-06 RX ADMIN — Medication 100 MILLIGRAM(S): at 01:35

## 2017-05-06 RX ADMIN — Medication 10 MILLIGRAM(S): at 05:49

## 2017-05-06 RX ADMIN — Medication 50 MILLIGRAM(S): at 05:48

## 2017-05-06 RX ADMIN — Medication 50 MILLIGRAM(S): at 23:11

## 2017-05-07 LAB
ANION GAP SERPL CALC-SCNC: 11 MMOL/L — SIGNIFICANT CHANGE UP (ref 5–17)
BUN SERPL-MCNC: 14 MG/DL — SIGNIFICANT CHANGE UP (ref 7–23)
CALCIUM SERPL-MCNC: 9.1 MG/DL — SIGNIFICANT CHANGE UP (ref 8.4–10.5)
CHLORIDE SERPL-SCNC: 104 MMOL/L — SIGNIFICANT CHANGE UP (ref 96–108)
CO2 SERPL-SCNC: 25 MMOL/L — SIGNIFICANT CHANGE UP (ref 22–31)
CREAT SERPL-MCNC: 0.66 MG/DL — SIGNIFICANT CHANGE UP (ref 0.5–1.3)
GLUCOSE SERPL-MCNC: 117 MG/DL — HIGH (ref 70–99)
HCT VFR BLD CALC: 24 % — LOW (ref 34.5–45)
HGB BLD-MCNC: 8 G/DL — LOW (ref 11.5–15.5)
MCHC RBC-ENTMCNC: 27.2 PG — SIGNIFICANT CHANGE UP (ref 27–34)
MCHC RBC-ENTMCNC: 33.1 GM/DL — SIGNIFICANT CHANGE UP (ref 32–36)
MCV RBC AUTO: 82.3 FL — SIGNIFICANT CHANGE UP (ref 80–100)
PLATELET # BLD AUTO: 158 K/UL — SIGNIFICANT CHANGE UP (ref 150–400)
POTASSIUM SERPL-MCNC: 4 MMOL/L — SIGNIFICANT CHANGE UP (ref 3.5–5.3)
POTASSIUM SERPL-SCNC: 4 MMOL/L — SIGNIFICANT CHANGE UP (ref 3.5–5.3)
RBC # BLD: 2.92 M/UL — LOW (ref 3.8–5.2)
RBC # FLD: 16.1 % — HIGH (ref 10.3–14.5)
SODIUM SERPL-SCNC: 140 MMOL/L — SIGNIFICANT CHANGE UP (ref 135–145)
WBC # BLD: 18.9 K/UL — HIGH (ref 3.8–10.5)
WBC # FLD AUTO: 18.9 K/UL — HIGH (ref 3.8–10.5)

## 2017-05-07 PROCEDURE — 71010: CPT | Mod: 26

## 2017-05-07 RX ADMIN — Medication 50 MILLIGRAM(S): at 23:37

## 2017-05-07 RX ADMIN — Medication 50 MILLIGRAM(S): at 12:04

## 2017-05-07 RX ADMIN — FAMOTIDINE 20 MILLIGRAM(S): 10 INJECTION INTRAVENOUS at 16:58

## 2017-05-07 RX ADMIN — Medication 81 MILLIGRAM(S): at 12:04

## 2017-05-07 RX ADMIN — Medication 100 MILLIGRAM(S): at 12:04

## 2017-05-07 RX ADMIN — Medication 50 MILLIGRAM(S): at 05:26

## 2017-05-07 RX ADMIN — Medication 50 MILLIGRAM(S): at 16:58

## 2017-05-07 RX ADMIN — Medication 100 MILLIGRAM(S): at 05:26

## 2017-05-07 RX ADMIN — FAMOTIDINE 20 MILLIGRAM(S): 10 INJECTION INTRAVENOUS at 05:26

## 2017-05-08 ENCOUNTER — TRANSCRIPTION ENCOUNTER (OUTPATIENT)
Age: 57
End: 2017-05-08

## 2017-05-08 VITALS — WEIGHT: 148.37 LBS

## 2017-05-08 LAB
ANION GAP SERPL CALC-SCNC: 11 MMOL/L — SIGNIFICANT CHANGE UP (ref 5–17)
BUN SERPL-MCNC: 14 MG/DL — SIGNIFICANT CHANGE UP (ref 7–23)
CALCIUM SERPL-MCNC: 9.1 MG/DL — SIGNIFICANT CHANGE UP (ref 8.4–10.5)
CHLORIDE SERPL-SCNC: 104 MMOL/L — SIGNIFICANT CHANGE UP (ref 96–108)
CO2 SERPL-SCNC: 26 MMOL/L — SIGNIFICANT CHANGE UP (ref 22–31)
CREAT SERPL-MCNC: 0.78 MG/DL — SIGNIFICANT CHANGE UP (ref 0.5–1.3)
GLUCOSE SERPL-MCNC: 115 MG/DL — HIGH (ref 70–99)
HCT VFR BLD CALC: 26 % — LOW (ref 34.5–45)
HGB BLD-MCNC: 8.4 G/DL — LOW (ref 11.5–15.5)
MCHC RBC-ENTMCNC: 27 PG — SIGNIFICANT CHANGE UP (ref 27–34)
MCHC RBC-ENTMCNC: 32.3 GM/DL — SIGNIFICANT CHANGE UP (ref 32–36)
MCV RBC AUTO: 83.5 FL — SIGNIFICANT CHANGE UP (ref 80–100)
PLATELET # BLD AUTO: 228 K/UL — SIGNIFICANT CHANGE UP (ref 150–400)
POTASSIUM SERPL-MCNC: 4.6 MMOL/L — SIGNIFICANT CHANGE UP (ref 3.5–5.3)
POTASSIUM SERPL-SCNC: 4.6 MMOL/L — SIGNIFICANT CHANGE UP (ref 3.5–5.3)
RBC # BLD: 3.11 M/UL — LOW (ref 3.8–5.2)
RBC # FLD: 16.5 % — HIGH (ref 10.3–14.5)
SODIUM SERPL-SCNC: 141 MMOL/L — SIGNIFICANT CHANGE UP (ref 135–145)
WBC # BLD: 13.2 K/UL — HIGH (ref 3.8–10.5)
WBC # FLD AUTO: 13.2 K/UL — HIGH (ref 3.8–10.5)

## 2017-05-08 PROCEDURE — P9045: CPT

## 2017-05-08 PROCEDURE — 93005 ELECTROCARDIOGRAM TRACING: CPT

## 2017-05-08 PROCEDURE — 97116 GAIT TRAINING THERAPY: CPT

## 2017-05-08 PROCEDURE — 71045 X-RAY EXAM CHEST 1 VIEW: CPT

## 2017-05-08 PROCEDURE — P9016: CPT

## 2017-05-08 PROCEDURE — C1751: CPT

## 2017-05-08 PROCEDURE — 84443 ASSAY THYROID STIM HORMONE: CPT

## 2017-05-08 PROCEDURE — 82803 BLOOD GASES ANY COMBINATION: CPT

## 2017-05-08 PROCEDURE — 85027 COMPLETE CBC AUTOMATED: CPT

## 2017-05-08 PROCEDURE — 86923 COMPATIBILITY TEST ELECTRIC: CPT

## 2017-05-08 PROCEDURE — 84295 ASSAY OF SERUM SODIUM: CPT

## 2017-05-08 PROCEDURE — 83605 ASSAY OF LACTIC ACID: CPT

## 2017-05-08 PROCEDURE — C1729: CPT

## 2017-05-08 PROCEDURE — 36430 TRANSFUSION BLD/BLD COMPNT: CPT

## 2017-05-08 PROCEDURE — 85610 PROTHROMBIN TIME: CPT

## 2017-05-08 PROCEDURE — P9047: CPT

## 2017-05-08 PROCEDURE — 84132 ASSAY OF SERUM POTASSIUM: CPT

## 2017-05-08 PROCEDURE — 97162 PT EVAL MOD COMPLEX 30 MIN: CPT

## 2017-05-08 PROCEDURE — 85730 THROMBOPLASTIN TIME PARTIAL: CPT

## 2017-05-08 PROCEDURE — 85014 HEMATOCRIT: CPT

## 2017-05-08 PROCEDURE — 86891 AUTOLOGOUS BLOOD OP SALVAGE: CPT

## 2017-05-08 PROCEDURE — C1889: CPT

## 2017-05-08 PROCEDURE — 82330 ASSAY OF CALCIUM: CPT

## 2017-05-08 PROCEDURE — 82553 CREATINE MB FRACTION: CPT

## 2017-05-08 PROCEDURE — 80053 COMPREHEN METABOLIC PANEL: CPT

## 2017-05-08 PROCEDURE — 82435 ASSAY OF BLOOD CHLORIDE: CPT

## 2017-05-08 PROCEDURE — 82550 ASSAY OF CK (CPK): CPT

## 2017-05-08 PROCEDURE — 80048 BASIC METABOLIC PNL TOTAL CA: CPT

## 2017-05-08 PROCEDURE — 94002 VENT MGMT INPAT INIT DAY: CPT

## 2017-05-08 PROCEDURE — 82947 ASSAY GLUCOSE BLOOD QUANT: CPT

## 2017-05-08 PROCEDURE — 84484 ASSAY OF TROPONIN QUANT: CPT

## 2017-05-08 PROCEDURE — C1769: CPT

## 2017-05-08 PROCEDURE — 85384 FIBRINOGEN ACTIVITY: CPT

## 2017-05-08 PROCEDURE — 88305 TISSUE EXAM BY PATHOLOGIST: CPT

## 2017-05-08 RX ORDER — METOPROLOL TARTRATE 50 MG
1 TABLET ORAL
Qty: 0 | Refills: 0 | COMMUNITY

## 2017-05-08 RX ORDER — AMOXICILLIN 250 MG/5ML
1 SUSPENSION, RECONSTITUTED, ORAL (ML) ORAL
Qty: 0 | Refills: 0 | COMMUNITY

## 2017-05-08 RX ORDER — OXYCODONE HYDROCHLORIDE 5 MG/1
2 TABLET ORAL
Qty: 40 | Refills: 0 | OUTPATIENT
Start: 2017-05-08 | End: 2017-05-13

## 2017-05-08 RX ORDER — ASPIRIN/CALCIUM CARB/MAGNESIUM 324 MG
1 TABLET ORAL
Qty: 30 | Refills: 0 | OUTPATIENT
Start: 2017-05-08 | End: 2017-06-07

## 2017-05-08 RX ORDER — FAMOTIDINE 10 MG/ML
1 INJECTION INTRAVENOUS
Qty: 60 | Refills: 0 | OUTPATIENT
Start: 2017-05-08 | End: 2017-06-07

## 2017-05-08 RX ORDER — DOCUSATE SODIUM 100 MG
1 CAPSULE ORAL
Qty: 90 | Refills: 0 | OUTPATIENT
Start: 2017-05-08 | End: 2017-06-07

## 2017-05-08 RX ORDER — METOPROLOL TARTRATE 50 MG
1 TABLET ORAL
Qty: 120 | Refills: 0 | OUTPATIENT
Start: 2017-05-08 | End: 2017-06-07

## 2017-05-08 RX ADMIN — Medication 100 MILLIGRAM(S): at 05:50

## 2017-05-08 RX ADMIN — Medication 50 MILLIGRAM(S): at 12:03

## 2017-05-08 RX ADMIN — Medication 50 MILLIGRAM(S): at 05:50

## 2017-05-08 RX ADMIN — Medication 81 MILLIGRAM(S): at 12:03

## 2017-05-08 RX ADMIN — FAMOTIDINE 20 MILLIGRAM(S): 10 INJECTION INTRAVENOUS at 05:50

## 2017-05-08 RX ADMIN — Medication 100 MILLIGRAM(S): at 12:03

## 2017-05-08 NOTE — DISCHARGE NOTE ADULT - CARE PROVIDER_API CALL
Chase Sharma), Surgery; Thoracic and Cardiac Surgery  [address] [place] [address]  Phone: [phone]  Fax: [phone] Chase Sharma), Surgery; Thoracic and Cardiac Surgery  [address] [place] [address]  Phone: [phone]  Fax: [phone]    Bill Mtz), Gastroenterology; Internal Medicine  [address] [place] [address]  Phone: [phone]  Fax: [phone]    James Preciado (ASHLEY), Medicine  [address] [place] [address]  Phone: [phone]  Fax: [phone]    Miels Mcintosh), Cardiovascular Disease; Nuclear Cardiology  [address] [place] [address]  Phone: [phone]  Fax: [phone]

## 2017-05-08 NOTE — DISCHARGE NOTE ADULT - CARE PLAN
Principal Discharge DX:	Non-rheumatic mitral regurgitation  Goal:	Full recovery. s/p MV repair.  Instructions for follow-up, activity and diet:	Follow up with Dr. Sharma on Wednesday, 5/24/17 at 9:00am.  Secondary Diagnosis:	Essential hypertension  Secondary Diagnosis:	Hyperlipidemia, unspecified hyperlipidemia type  Secondary Diagnosis:	Gastroesophageal reflux disease without esophagitis  Secondary Diagnosis:	Fatty liver Principal Discharge DX:	Non-rheumatic mitral regurgitation  Goal:	Full recovery. s/p MV repair.  Instructions for follow-up, activity and diet:	Follow up with Dr. Sharma on Wednesday, 5/24/17 at 9:00am.  Follow up with Dr. Mcintosh, outpatient cardiologist.  Follow up with Dr. Preciado, outpatient primary care physician.  Follow up with Dr. Mtz, outpatient gastroenterologist.  Secondary Diagnosis:	Essential hypertension  Secondary Diagnosis:	Hyperlipidemia, unspecified hyperlipidemia type  Secondary Diagnosis:	Gastroesophageal reflux disease without esophagitis  Secondary Diagnosis:	Fatty liver Principal Discharge DX:	Non-rheumatic mitral regurgitation  Goal:	Full recovery. s/p MV repair.  Instructions for follow-up, activity and diet:	Follow up with Dr. Sharma on Wednesday, 5/24/17 at 9:00am.  Follow up with Dr. Mcintosh, outpatient cardiologist.  Follow up with Dr. Preciado, outpatient primary care physician.  Follow up with Dr. Mtz, outpatient gastroenterologist.  Secondary Diagnosis:	Essential hypertension  Goal:	Full recovery.  Instructions for follow-up, activity and diet:	Continue to take medications as directed.  Secondary Diagnosis:	Hyperlipidemia, unspecified hyperlipidemia type  Goal:	Full recovery.  Instructions for follow-up, activity and diet:	Diet and exercise  Secondary Diagnosis:	Gastroesophageal reflux disease without esophagitis  Goal:	Full recovery.  Instructions for follow-up, activity and diet:	Continue to take medications as directed.  Secondary Diagnosis:	Fatty liver  Goal:	Full recovery.  Instructions for follow-up, activity and diet:	Diet and exercise

## 2017-05-08 NOTE — DISCHARGE NOTE ADULT - PROVIDER TOKENS
TOKEN:'[id number]' TOKEN:'[id number]:'[id number]:'[id number]',TOKEN:'[id number]'

## 2017-05-08 NOTE — DISCHARGE NOTE ADULT - MEDICATION SUMMARY - MEDICATIONS TO TAKE
I will START or STAY ON the medications listed below when I get home from the hospital:    acetaminophen-oxycodone 325 mg-5 mg oral tablet  -- 2 tab(s) by mouth every 6 hours, As needed, Moderate Pain take one-two tabs as needed every 6 hours for pain PRN MDD:6 tabs  -- Indication: For pain    aspirin 81 mg oral delayed release tablet  -- 1 tab(s) by mouth once a day  -- Indication: For blood thinner    metoprolol tartrate 50 mg oral tablet  -- 1 tab(s) by mouth every 6 hours  -- Indication: For Heart rate    guaiFENesin 100 mg/5 mL oral liquid  -- 10 milliliter(s) by mouth every 8 hours, As Needed, Cough  -- Indication: For cough    famotidine 20 mg oral tablet  -- 1 tab(s) by mouth 2 times a day  -- Indication: For antacid    docusate sodium 100 mg oral capsule  -- 1 cap(s) by mouth 3 times a day  -- Indication: For stool softner

## 2017-05-08 NOTE — DISCHARGE NOTE ADULT - CARE PROVIDERS DIRECT ADDRESSES
[email or web address]

## 2017-05-08 NOTE — DISCHARGE NOTE ADULT - PATIENT PORTAL LINK FT
“You can access the FollowHealth Patient Portal, offered by [facility], by registering with the following website: [email or web address]”

## 2017-05-08 NOTE — DISCHARGE NOTE ADULT - INSTRUCTIONS
Regular diet - low fat, low cholesterol, no added salt. shower daily no creams or  lotion on incision

## 2017-05-08 NOTE — DISCHARGE NOTE ADULT - ADDITIONAL INSTRUCTIONS
1. Daily Shower  2. Weight yourself daily and notify any weight gain greater than 2-3 pounds in 24 hours.  3. Regular diet - low fat, low cholesterol, no added salt.  4. Cleanse Midsternal incision and leg incision daily while showering with warm water and mild soap, pat dry and maintain open to air.   5. Follow Cardiac Surgery Do's and Don'ts discharge instructions.   6. No driving until cleared by MD.   7. No heavy lifting nothing greater than 5 pounds until cleared by MD.   8. Call / Notify MD any fever greater than 101.0  9. Increase Activity as tolerated.

## 2017-05-08 NOTE — DISCHARGE NOTE ADULT - PLAN OF CARE
Full recovery. s/p MV repair. Follow up with Dr. Sharma on Wednesday, 5/24/17 at 9:00am. Follow up with Dr. Sharma on Wednesday, 5/24/17 at 9:00am.  Follow up with Dr. Mcintosh, outpatient cardiologist.  Follow up with Dr. Preciado, outpatient primary care physician.  Follow up with Dr. Mtz, outpatient gastroenterologist. Full recovery. Continue to take medications as directed. Diet and exercise

## 2017-05-08 NOTE — DISCHARGE NOTE ADULT - SECONDARY DIAGNOSIS.
Essential hypertension Hyperlipidemia, unspecified hyperlipidemia type Gastroesophageal reflux disease without esophagitis Fatty liver

## 2017-05-08 NOTE — DISCHARGE NOTE ADULT - HOSPITAL COURSE
56F PMH dyspnea, GERD, HTN, HLD, MR, BARTHOLOMEW and angina with exertion over the past 2 years, was previously scheduled for MVR in March, but was cancelled due to tooth abscess. The patient went to her dentist and had treatment, completed her amoxicillin 5/4/17 and presented the same day for mitral valve repair/replacement and septal myomectomy.    Hospital course: 5/4/17 MV repair with Efraín stitch, septomyomectomy                           Intraop MARISA: evidence for hyperdynamic LV, LVOT; on Esmolol gtt, now on PO beta blockers.                           Pressor support required, weaned off                           Thrombocytopenia, now resolved                           ABL anemia, transfused with PRBC, H/H now 8.4/26.                           Leukocytosis max 22, now 13.2.                           No statin use 2/2 history of BARTHOLOMEW.                           Discharged home with PT 5/8/17. 56F PMH dyspnea, GERD, HTN, HLD, MR, BARTHOLOMEW and angina with exertion over the past 2 years, was previously scheduled for MVR in March, but was cancelled due to tooth abscess. The patient underwent tooth extraction. Completed her amoxicillin 5/4/17 and presented the same day for mitral valve repair/replacement and septal myomectomy.    Hospital course: 5/4/17 MV repair with Efraín stitch, septomyomectomy                           Intraop MARISA: evidence for hyperdynamic LV, LVOT; on Esmolol gtt, now on PO beta blockers.                           Pressor support required, weaned off                           Thrombocytopenia, now resolved                           ABL anemia, transfused with PRBC, H/H now 8.4/26.                           Leukocytosis max 22, now 13.2.                           No statin use 2/2 history of BARTHOLOMEW.                           Discharged home with PT 5/8/17.

## 2017-05-08 NOTE — DISCHARGE NOTE ADULT - MEDICATION SUMMARY - MEDICATIONS TO CHANGE
I will SWITCH the dose or number of times a day I take the medications listed below when I get home from the hospital:    metoprolol tartrate 100 mg oral tablet  -- 1 tab(s) by mouth 2 times a day

## 2017-05-08 NOTE — DISCHARGE NOTE ADULT - NS AS ACTIVITY OBS
Walking-Outdoors allowed/Stairs allowed/No Heavy lifting/straining/Do not drive or operate machinery/Showering allowed/Walking-Indoors allowed

## 2017-05-15 ENCOUNTER — INPATIENT (INPATIENT)
Facility: HOSPITAL | Age: 57
LOS: 1 days | Discharge: HOME | DRG: 812 | End: 2017-05-17
Payer: COMMERCIAL

## 2017-05-15 VITALS
RESPIRATION RATE: 20 BRPM | OXYGEN SATURATION: 97 % | SYSTOLIC BLOOD PRESSURE: 102 MMHG | TEMPERATURE: 99 F | HEART RATE: 110 BPM | DIASTOLIC BLOOD PRESSURE: 72 MMHG

## 2017-05-15 DIAGNOSIS — D64.9 ANEMIA, UNSPECIFIED: ICD-10-CM

## 2017-05-15 DIAGNOSIS — Z98.890 OTHER SPECIFIED POSTPROCEDURAL STATES: Chronic | ICD-10-CM

## 2017-05-15 LAB
ALBUMIN SERPL ELPH-MCNC: 3.7 G/DL — SIGNIFICANT CHANGE UP (ref 3.3–5)
ALP SERPL-CCNC: 110 U/L — SIGNIFICANT CHANGE UP (ref 40–120)
ALT FLD-CCNC: 35 U/L RC — SIGNIFICANT CHANGE UP (ref 10–45)
ANION GAP SERPL CALC-SCNC: 15 MMOL/L — SIGNIFICANT CHANGE UP (ref 5–17)
APPEARANCE UR: CLEAR — SIGNIFICANT CHANGE UP
AST SERPL-CCNC: 43 U/L — HIGH (ref 10–40)
BACTERIA # UR AUTO: NEGATIVE /HPF — SIGNIFICANT CHANGE UP
BASOPHILS # BLD AUTO: 0 K/UL — SIGNIFICANT CHANGE UP (ref 0–0.2)
BILIRUB SERPL-MCNC: 0.5 MG/DL — SIGNIFICANT CHANGE UP (ref 0.2–1.2)
BILIRUB UR-MCNC: NEGATIVE — SIGNIFICANT CHANGE UP
BLD GP AB SCN SERPL QL: NEGATIVE — SIGNIFICANT CHANGE UP
BUN SERPL-MCNC: 10 MG/DL — SIGNIFICANT CHANGE UP (ref 7–23)
CALCIUM SERPL-MCNC: 9.3 MG/DL — SIGNIFICANT CHANGE UP (ref 8.4–10.5)
CHLORIDE SERPL-SCNC: 100 MMOL/L — SIGNIFICANT CHANGE UP (ref 96–108)
CO2 SERPL-SCNC: 24 MMOL/L — SIGNIFICANT CHANGE UP (ref 22–31)
COLOR SPEC: YELLOW — SIGNIFICANT CHANGE UP
CREAT SERPL-MCNC: 0.91 MG/DL — SIGNIFICANT CHANGE UP (ref 0.5–1.3)
DIFF PNL FLD: NEGATIVE — SIGNIFICANT CHANGE UP
EOSINOPHIL # BLD AUTO: 0 K/UL — SIGNIFICANT CHANGE UP (ref 0–0.5)
EOSINOPHIL NFR BLD AUTO: 1 % — SIGNIFICANT CHANGE UP (ref 0–6)
GAS PNL BLDV: SIGNIFICANT CHANGE UP
GLUCOSE SERPL-MCNC: 134 MG/DL — HIGH (ref 70–99)
GLUCOSE UR QL: NEGATIVE — SIGNIFICANT CHANGE UP
HCT VFR BLD CALC: 23.6 % — LOW (ref 34.5–45)
HGB BLD-MCNC: 7.6 G/DL — LOW (ref 11.5–15.5)
INR BLD: 1.4 RATIO — HIGH (ref 0.88–1.16)
KETONES UR-MCNC: NEGATIVE — SIGNIFICANT CHANGE UP
LEUKOCYTE ESTERASE UR-ACNC: NEGATIVE — SIGNIFICANT CHANGE UP
LYMPHOCYTES # BLD AUTO: 1.7 K/UL — SIGNIFICANT CHANGE UP (ref 1–3.3)
LYMPHOCYTES # BLD AUTO: 9 % — LOW (ref 13–44)
MCHC RBC-ENTMCNC: 27.6 PG — SIGNIFICANT CHANGE UP (ref 27–34)
MCHC RBC-ENTMCNC: 32.3 GM/DL — SIGNIFICANT CHANGE UP (ref 32–36)
MCV RBC AUTO: 85.6 FL — SIGNIFICANT CHANGE UP (ref 80–100)
MONOCYTES # BLD AUTO: 1 K/UL — HIGH (ref 0–0.9)
MONOCYTES NFR BLD AUTO: 2 % — SIGNIFICANT CHANGE UP (ref 2–14)
NEUTROPHILS # BLD AUTO: 8.9 K/UL — HIGH (ref 1.8–7.4)
NEUTROPHILS NFR BLD AUTO: 88 % — HIGH (ref 43–77)
NITRITE UR-MCNC: NEGATIVE — SIGNIFICANT CHANGE UP
PH UR: 7 — SIGNIFICANT CHANGE UP (ref 5–8)
PLATELET # BLD AUTO: 518 K/UL — HIGH (ref 150–400)
POTASSIUM SERPL-MCNC: 4 MMOL/L — SIGNIFICANT CHANGE UP (ref 3.5–5.3)
POTASSIUM SERPL-SCNC: 4 MMOL/L — SIGNIFICANT CHANGE UP (ref 3.5–5.3)
PROT SERPL-MCNC: 7.6 G/DL — SIGNIFICANT CHANGE UP (ref 6–8.3)
PROT UR-MCNC: SIGNIFICANT CHANGE UP
PROTHROM AB SERPL-ACNC: 15.2 SEC — HIGH (ref 9.8–12.7)
RBC # BLD: 2.76 M/UL — LOW (ref 3.8–5.2)
RBC # FLD: 17.1 % — HIGH (ref 10.3–14.5)
RBC CASTS # UR COMP ASSIST: SIGNIFICANT CHANGE UP /HPF (ref 0–2)
RH IG SCN BLD-IMP: NEGATIVE — SIGNIFICANT CHANGE UP
SODIUM SERPL-SCNC: 139 MMOL/L — SIGNIFICANT CHANGE UP (ref 135–145)
SP GR SPEC: 1.01 — SIGNIFICANT CHANGE UP (ref 1.01–1.02)
UROBILINOGEN FLD QL: NEGATIVE — SIGNIFICANT CHANGE UP
WBC # BLD: 11.7 K/UL — HIGH (ref 3.8–10.5)
WBC # FLD AUTO: 11.7 K/UL — HIGH (ref 3.8–10.5)
WBC UR QL: SIGNIFICANT CHANGE UP /HPF (ref 0–5)

## 2017-05-15 PROCEDURE — 99284 EMERGENCY DEPT VISIT MOD MDM: CPT | Mod: 25

## 2017-05-15 PROCEDURE — 71020: CPT | Mod: 26

## 2017-05-15 PROCEDURE — 93010 ELECTROCARDIOGRAM REPORT: CPT

## 2017-05-15 PROCEDURE — 93306 TTE W/DOPPLER COMPLETE: CPT | Mod: 26

## 2017-05-15 RX ORDER — ASPIRIN/CALCIUM CARB/MAGNESIUM 324 MG
81 TABLET ORAL DAILY
Qty: 0 | Refills: 0 | Status: DISCONTINUED | OUTPATIENT
Start: 2017-05-15 | End: 2017-05-17

## 2017-05-15 RX ORDER — HEPARIN SODIUM 5000 [USP'U]/ML
5000 INJECTION INTRAVENOUS; SUBCUTANEOUS EVERY 8 HOURS
Qty: 0 | Refills: 0 | Status: DISCONTINUED | OUTPATIENT
Start: 2017-05-15 | End: 2017-05-17

## 2017-05-15 RX ORDER — FAMOTIDINE 10 MG/ML
20 INJECTION INTRAVENOUS
Qty: 0 | Refills: 0 | Status: DISCONTINUED | OUTPATIENT
Start: 2017-05-15 | End: 2017-05-17

## 2017-05-15 RX ORDER — DOCUSATE SODIUM 100 MG
100 CAPSULE ORAL THREE TIMES A DAY
Qty: 0 | Refills: 0 | Status: DISCONTINUED | OUTPATIENT
Start: 2017-05-15 | End: 2017-05-17

## 2017-05-15 RX ORDER — METOPROLOL TARTRATE 50 MG
50 TABLET ORAL EVERY 6 HOURS
Qty: 0 | Refills: 0 | Status: DISCONTINUED | OUTPATIENT
Start: 2017-05-15 | End: 2017-05-16

## 2017-05-15 RX ADMIN — Medication 50 MILLIGRAM(S): at 23:54

## 2017-05-15 NOTE — ED PROVIDER NOTE - OBJECTIVE STATEMENT
57 yo F mitral valve surgery May 4th Dr. Sharma d/kvng from hospital 1 week ago experienced sudden vision loss at home 3 days ago. Pt went to PCP for labs and was told to come to ED. Currently states vision changes have resolved and besides feeling tired has no current complaints. Denies cough, fever, N/V/D, abdominal pain, chest pain, palpitations, SOB. 57 yo F mitral valve surgery May 4th Dr. Sharma d/kvng from hospital 1 week ago experienced sudden vision loss at home 3 days ago. On ASA. Pt went to PCP for labs and was told to come to ED. Currently states vision changes have resolved and besides feeling tired has no current complaints. Denies cough, fever, N/V/D, abdominal pain, chest pain, palpitations, SOB.

## 2017-05-15 NOTE — ED PROVIDER NOTE - PHYSICAL EXAMINATION
A&Ox3, pt appears tired but in no apparent distress. Sternotomy incision well healing. Heart rate tachycardic A&Ox3, pt appears tired but in no apparent distress. Sternotomy incision well healing. Skin color normal for race. Oral mucosa WNL. Heart rate tachycardic. Lungs CTA b/l. Abdomen soft nontender nondistended without guarding.

## 2017-05-15 NOTE — ED ADULT NURSE NOTE - CHPI ED SYMPTOMS NEG
no cough/no syncope/no dizziness/no diaphoresis/no chills/no vomiting/no nausea/no back pain/no fever

## 2017-05-15 NOTE — ED PROVIDER NOTE - CHPI ED SYMPTOMS NEG
no fever/no back pain/no dizziness/no vomiting/no nausea/no diaphoresis/no chills/no shortness of breath/no syncope no diaphoresis/no syncope/no chills/no back pain/no fever/no vomiting/no dizziness/no nausea

## 2017-05-15 NOTE — H&P ADULT. - FAMILY HISTORY
<<-----Click on this checkbox to enter Family History Family history of heart disease     Sibling  Still living? Yes, Estimated age: Age Unknown  Family history of hypertension, Age at diagnosis: Age Unknown

## 2017-05-15 NOTE — ED PROVIDER NOTE - ATTENDING CONTRIBUTION TO CARE
I have seen and evaluated this patient with the resident.   I agree with the findings  unless other wise stated.  I have made appropriate changes in documentations where needed, After my face to face bedside evaluation, I am further  noting: Recent surgery for mitral valve replacement feeling weak sob with exertion pallor + nonfocal neuro exam will do labs cxr re eval CT eval --Mcarthur I have seen and evaluated this patient with the PA   I agree with the findings  unless other wise stated.  I have made appropriate changes in documentations where needed, After my face to face bedside evaluation, I am further  noting: Recent surgery for mitral valve replacement feeling weak sob with exertion pallor + nonfocal neuro exam will do labs cxr re eval CT eval --Mcarthur

## 2017-05-15 NOTE — ED ADULT NURSE NOTE - OBJECTIVE STATEMENT
57 yo female A&OX3 presents to the ED with the c/o " called in for abd labs". Pt states that had blood work done last week and was called back today with " abd results on blood test". Pt unaware as to which blood test came back abd. As per pt she had a episode of waking up last week with no vision, states that she went to md for blood work. Pt had mitral valve repair 2 weeks ago, site is clean, dry and intact, denies fevers or chills. Neuro intact, perrl, no weakness and no dizziness. Lungs lear, equal b/l, py hr elevated and pt appears tachypneic. Abr round, soft non tender and non distended.

## 2017-05-15 NOTE — H&P ADULT. - PMH
Anemia, unspecified type    Fatty liver    GERD (gastroesophageal reflux disease)    HLD (hyperlipidemia)    HTN (hypertension)    Mitral valve insufficiency    MR (mitral regurgitation)

## 2017-05-15 NOTE — H&P ADULT. - HISTORY OF PRESENT ILLNESS
This is a This is a 57y/o female PMH; WALSH ,GERD,HLD,HTN,BARTHOLOMEW, s/p MV repair with Efraín yeager, septomyomectomy  5/4/17. Patient discharged home on 5/8/17. Patient  presented to PCP this am with c/o SOB and fatigue x 1 week - labs drawn patient instructed to report to [facility] ED.HCT 23.6.  Patient admitted to [address] [place] [address].

## 2017-05-15 NOTE — ED PROVIDER NOTE - MEDICAL DECISION MAKING DETAILS
Recent surgery for mitral valve replacement feeling weak sob with exertion pallor + nonfocal neuro exam will do labs cxr re eval CT eval --Mcarthur

## 2017-05-16 LAB
BUN SERPL-MCNC: 9 MG/DL — SIGNIFICANT CHANGE UP (ref 7–23)
CALCIUM SERPL-MCNC: 8.9 MG/DL — SIGNIFICANT CHANGE UP (ref 8.4–10.5)
CHLORIDE SERPL-SCNC: 101 MMOL/L — SIGNIFICANT CHANGE UP (ref 96–108)
CO2 SERPL-SCNC: 23 MMOL/L — SIGNIFICANT CHANGE UP (ref 22–31)
CREAT SERPL-MCNC: 0.73 MG/DL — SIGNIFICANT CHANGE UP (ref 0.5–1.3)
D DIMER BLD IA.RAPID-MCNC: 2641 NG/ML DDU — HIGH
FIBRINOGEN PPP-MCNC: 692 MG/DL — HIGH (ref 310–510)
GLUCOSE SERPL-MCNC: 108 MG/DL — HIGH (ref 70–99)
HCT VFR BLD CALC: 26.6 % — LOW (ref 34.5–45)
HGB BLD-MCNC: 8.6 G/DL — LOW (ref 11.5–15.5)
MCHC RBC-ENTMCNC: 27.5 PG — SIGNIFICANT CHANGE UP (ref 27–34)
MCHC RBC-ENTMCNC: 32.3 GM/DL — SIGNIFICANT CHANGE UP (ref 32–36)
MCV RBC AUTO: 85.1 FL — SIGNIFICANT CHANGE UP (ref 80–100)
PLATELET # BLD AUTO: 473 K/UL — HIGH (ref 150–400)
POTASSIUM SERPL-MCNC: 4.8 MMOL/L — SIGNIFICANT CHANGE UP (ref 3.5–5.3)
POTASSIUM SERPL-SCNC: 4.8 MMOL/L — SIGNIFICANT CHANGE UP (ref 3.5–5.3)
RBC # BLD: 3.12 M/UL — LOW (ref 3.8–5.2)
RBC # FLD: 16.4 % — HIGH (ref 10.3–14.5)
SODIUM SERPL-SCNC: 137 MMOL/L — SIGNIFICANT CHANGE UP (ref 135–145)
WBC # BLD: 12.6 K/UL — HIGH (ref 3.8–10.5)
WBC # FLD AUTO: 12.6 K/UL — HIGH (ref 3.8–10.5)

## 2017-05-16 PROCEDURE — 93306 TTE W/DOPPLER COMPLETE: CPT | Mod: 26

## 2017-05-16 PROCEDURE — 93010 ELECTROCARDIOGRAM REPORT: CPT

## 2017-05-16 PROCEDURE — 71275 CT ANGIOGRAPHY CHEST: CPT | Mod: 26

## 2017-05-16 RX ORDER — METOPROLOL TARTRATE 50 MG
50 TABLET ORAL EVERY 6 HOURS
Qty: 0 | Refills: 0 | Status: DISCONTINUED | OUTPATIENT
Start: 2017-05-16 | End: 2017-05-17

## 2017-05-16 RX ORDER — METOPROLOL TARTRATE 50 MG
50 TABLET ORAL EVERY 6 HOURS
Qty: 0 | Refills: 0 | Status: DISCONTINUED | OUTPATIENT
Start: 2017-05-16 | End: 2017-05-16

## 2017-05-16 RX ADMIN — Medication 50 MILLIGRAM(S): at 17:52

## 2017-05-16 RX ADMIN — Medication 50 MILLIGRAM(S): at 22:28

## 2017-05-16 RX ADMIN — FAMOTIDINE 20 MILLIGRAM(S): 10 INJECTION INTRAVENOUS at 17:32

## 2017-05-16 RX ADMIN — FAMOTIDINE 20 MILLIGRAM(S): 10 INJECTION INTRAVENOUS at 06:57

## 2017-05-16 RX ADMIN — Medication 100 MILLIGRAM(S): at 06:56

## 2017-05-16 RX ADMIN — Medication 81 MILLIGRAM(S): at 11:10

## 2017-05-16 RX ADMIN — HEPARIN SODIUM 5000 UNIT(S): 5000 INJECTION INTRAVENOUS; SUBCUTANEOUS at 22:28

## 2017-05-16 RX ADMIN — HEPARIN SODIUM 5000 UNIT(S): 5000 INJECTION INTRAVENOUS; SUBCUTANEOUS at 06:56

## 2017-05-17 ENCOUNTER — TRANSCRIPTION ENCOUNTER (OUTPATIENT)
Age: 57
End: 2017-05-17

## 2017-05-17 VITALS — WEIGHT: 144.84 LBS

## 2017-05-17 LAB
ANION GAP SERPL CALC-SCNC: 12 MMOL/L — SIGNIFICANT CHANGE UP (ref 5–17)
BUN SERPL-MCNC: 11 MG/DL — SIGNIFICANT CHANGE UP (ref 7–23)
CALCIUM SERPL-MCNC: 9.1 MG/DL — SIGNIFICANT CHANGE UP (ref 8.4–10.5)
CHLORIDE SERPL-SCNC: 101 MMOL/L — SIGNIFICANT CHANGE UP (ref 96–108)
CO2 SERPL-SCNC: 25 MMOL/L — SIGNIFICANT CHANGE UP (ref 22–31)
CREAT SERPL-MCNC: 0.76 MG/DL — SIGNIFICANT CHANGE UP (ref 0.5–1.3)
GLUCOSE SERPL-MCNC: 97 MG/DL — SIGNIFICANT CHANGE UP (ref 70–99)
HCT VFR BLD CALC: 27.7 % — LOW (ref 34.5–45)
HGB BLD-MCNC: 8.7 G/DL — LOW (ref 11.5–15.5)
MCHC RBC-ENTMCNC: 26.6 PG — LOW (ref 27–34)
MCHC RBC-ENTMCNC: 31.2 GM/DL — LOW (ref 32–36)
MCV RBC AUTO: 85.3 FL — SIGNIFICANT CHANGE UP (ref 80–100)
PLATELET # BLD AUTO: 509 K/UL — HIGH (ref 150–400)
POTASSIUM SERPL-MCNC: 4.8 MMOL/L — SIGNIFICANT CHANGE UP (ref 3.5–5.3)
POTASSIUM SERPL-SCNC: 4.8 MMOL/L — SIGNIFICANT CHANGE UP (ref 3.5–5.3)
RBC # BLD: 3.25 M/UL — LOW (ref 3.8–5.2)
RBC # FLD: 16.8 % — HIGH (ref 10.3–14.5)
SODIUM SERPL-SCNC: 138 MMOL/L — SIGNIFICANT CHANGE UP (ref 135–145)
WBC # BLD: 9.4 K/UL — SIGNIFICANT CHANGE UP (ref 3.8–10.5)
WBC # FLD AUTO: 9.4 K/UL — SIGNIFICANT CHANGE UP (ref 3.8–10.5)

## 2017-05-17 PROCEDURE — 82330 ASSAY OF CALCIUM: CPT

## 2017-05-17 PROCEDURE — 85610 PROTHROMBIN TIME: CPT

## 2017-05-17 PROCEDURE — 86901 BLOOD TYPING SEROLOGIC RH(D): CPT

## 2017-05-17 PROCEDURE — 86923 COMPATIBILITY TEST ELECTRIC: CPT

## 2017-05-17 PROCEDURE — 84132 ASSAY OF SERUM POTASSIUM: CPT

## 2017-05-17 PROCEDURE — 86850 RBC ANTIBODY SCREEN: CPT

## 2017-05-17 PROCEDURE — 93306 TTE W/DOPPLER COMPLETE: CPT

## 2017-05-17 PROCEDURE — 81001 URINALYSIS AUTO W/SCOPE: CPT

## 2017-05-17 PROCEDURE — 86900 BLOOD TYPING SEROLOGIC ABO: CPT

## 2017-05-17 PROCEDURE — 71046 X-RAY EXAM CHEST 2 VIEWS: CPT

## 2017-05-17 PROCEDURE — 80048 BASIC METABOLIC PNL TOTAL CA: CPT

## 2017-05-17 PROCEDURE — 99283 EMERGENCY DEPT VISIT LOW MDM: CPT | Mod: 25

## 2017-05-17 PROCEDURE — 83605 ASSAY OF LACTIC ACID: CPT

## 2017-05-17 PROCEDURE — 71275 CT ANGIOGRAPHY CHEST: CPT

## 2017-05-17 PROCEDURE — 85027 COMPLETE CBC AUTOMATED: CPT

## 2017-05-17 PROCEDURE — 82803 BLOOD GASES ANY COMBINATION: CPT

## 2017-05-17 PROCEDURE — 85384 FIBRINOGEN ACTIVITY: CPT

## 2017-05-17 PROCEDURE — 80053 COMPREHEN METABOLIC PANEL: CPT

## 2017-05-17 PROCEDURE — 85014 HEMATOCRIT: CPT

## 2017-05-17 PROCEDURE — 36430 TRANSFUSION BLD/BLD COMPNT: CPT

## 2017-05-17 PROCEDURE — 82435 ASSAY OF BLOOD CHLORIDE: CPT

## 2017-05-17 PROCEDURE — C8929: CPT

## 2017-05-17 PROCEDURE — 93005 ELECTROCARDIOGRAM TRACING: CPT

## 2017-05-17 PROCEDURE — 85379 FIBRIN DEGRADATION QUANT: CPT

## 2017-05-17 PROCEDURE — 84295 ASSAY OF SERUM SODIUM: CPT

## 2017-05-17 PROCEDURE — P9016: CPT

## 2017-05-17 PROCEDURE — 82947 ASSAY GLUCOSE BLOOD QUANT: CPT

## 2017-05-17 RX ORDER — METOPROLOL TARTRATE 50 MG
50 TABLET ORAL ONCE
Qty: 0 | Refills: 0 | Status: COMPLETED | OUTPATIENT
Start: 2017-05-17 | End: 2017-05-17

## 2017-05-17 RX ORDER — METOPROLOL TARTRATE 50 MG
1 TABLET ORAL
Qty: 90 | Refills: 0 | OUTPATIENT
Start: 2017-05-17 | End: 2017-06-16

## 2017-05-17 RX ORDER — ASPIRIN/CALCIUM CARB/MAGNESIUM 324 MG
1 TABLET ORAL
Qty: 30 | Refills: 0 | OUTPATIENT
Start: 2017-05-17 | End: 2017-06-16

## 2017-05-17 RX ORDER — DOCUSATE SODIUM 100 MG
1 CAPSULE ORAL
Qty: 90 | Refills: 0 | OUTPATIENT
Start: 2017-05-17 | End: 2017-06-16

## 2017-05-17 RX ORDER — METOPROLOL TARTRATE 50 MG
100 TABLET ORAL EVERY 8 HOURS
Qty: 0 | Refills: 0 | Status: DISCONTINUED | OUTPATIENT
Start: 2017-05-17 | End: 2017-05-17

## 2017-05-17 RX ORDER — FAMOTIDINE 10 MG/ML
1 INJECTION INTRAVENOUS
Qty: 60 | Refills: 0 | OUTPATIENT
Start: 2017-05-17 | End: 2017-06-16

## 2017-05-17 RX ADMIN — HEPARIN SODIUM 5000 UNIT(S): 5000 INJECTION INTRAVENOUS; SUBCUTANEOUS at 05:51

## 2017-05-17 RX ADMIN — Medication 50 MILLIGRAM(S): at 09:24

## 2017-05-17 RX ADMIN — Medication 100 MILLIGRAM(S): at 05:51

## 2017-05-17 RX ADMIN — Medication 81 MILLIGRAM(S): at 11:26

## 2017-05-17 RX ADMIN — Medication 200 MILLIGRAM(S): at 09:24

## 2017-05-17 RX ADMIN — Medication 50 MILLIGRAM(S): at 05:51

## 2017-05-17 RX ADMIN — FAMOTIDINE 20 MILLIGRAM(S): 10 INJECTION INTRAVENOUS at 05:51

## 2017-05-17 NOTE — DISCHARGE NOTE ADULT - MEDICATION SUMMARY - MEDICATIONS TO CHANGE
I will SWITCH the dose or number of times a day I take the medications listed below when I get home from the hospital:    metoprolol tartrate 50 mg oral tablet  -- 1 tab(s) by mouth every 6 hours I will SWITCH the dose or number of times a day I take the medications listed below when I get home from the hospital:  None

## 2017-05-17 NOTE — DISCHARGE NOTE ADULT - PATIENT PORTAL LINK FT
“You can access the FollowHealth Patient Portal, offered by [facility], by registering with the following website: [email or web address]”

## 2017-05-17 NOTE — DISCHARGE NOTE ADULT - NS AS ACTIVITY OBS
Showering allowed/Walking-Indoors allowed/No Heavy lifting/straining/Stairs allowed/Walking-Outdoors allowed/Do not drive or operate machinery

## 2017-05-17 NOTE — DISCHARGE NOTE ADULT - MEDICATION SUMMARY - MEDICATIONS TO TAKE
I will START or STAY ON the medications listed below when I get home from the hospital:    aspirin 81 mg oral delayed release tablet  -- 1 tab(s) by mouth once a day  -- Indication: For Blood thinner    Metoprolol Tartrate 100 mg oral tablet  -- 1 tab(s) by mouth every 8 hours  -- Indication: For Blood pressure control    Pepcid 20 mg oral tablet  -- 1 tab(s) by mouth 2 times a day  -- Indication: For Acid reflux    docusate sodium 100 mg oral capsule  -- 1 cap(s) by mouth 3 times a day  -- Indication: For Stool softener

## 2017-05-17 NOTE — DISCHARGE NOTE ADULT - CARE PLAN
Principal Discharge DX:	Anemia, unspecified type  Goal:	S/p Transfusion of 1U RBC. H/H now stable.  Instructions for follow-up, activity and diet:	F/u with PCP, Dr. Sharma after discharge.  Secondary Diagnosis:	Fatty liver  Goal:	Full recovery.  Instructions for follow-up, activity and diet:	Diet, exercise.  Secondary Diagnosis:	Mixed hyperlipidemia  Goal:	Full recovery.  Instructions for follow-up, activity and diet:	Diet, exercise. Continue to take medications as directed.  Secondary Diagnosis:	Essential hypertension  Goal:	Full recovery.  Instructions for follow-up, activity and diet:	Continue to take medications as directed. Sodium-restricted diet.  Secondary Diagnosis:	H/O mitral valve repair  Goal:	S/p MV repair  Instructions for follow-up, activity and diet:	Continue to take medications as directed.

## 2017-05-17 NOTE — DISCHARGE NOTE ADULT - PLAN OF CARE
S/p Transfusion of 1U RBC. H/H now stable. F/u with PCP, Dr. Sharma after discharge. Full recovery. Diet, exercise. Diet, exercise. Continue to take medications as directed. Continue to take medications as directed. Sodium-restricted diet. S/p MV repair Continue to take medications as directed.

## 2017-05-17 NOTE — DISCHARGE NOTE ADULT - CARE PROVIDER_API CALL
Chase Sharma), Surgery; Thoracic and Cardiac Surgery  [address] [place] [address]  Phone: [phone]  Fax: [phone] Chase Sharma), Surgery; Thoracic and Cardiac Surgery  [address] [place] [address]  Phone: [phone]  Fax: [phone]    Bill Mtz), Gastroenterology; Internal Medicine  [address] [place] [address]  Phone: [phone]  Fax: [phone]    James Preciado (ASHLEY), Medicine  [address] [place] [address]  Phone: [phone]  Fax: [phone]    Miles Mcintosh), Cardiovascular Disease; Nuclear Cardiology  [address] [place] [address]  Phone: [phone]  Fax: [phone]

## 2017-05-17 NOTE — DISCHARGE NOTE ADULT - INSTRUCTIONS
Regular diet - low fat, low cholesterol, no added salt. Appointments, Diet, Activity, Medication Administration

## 2017-05-17 NOTE — DISCHARGE NOTE ADULT - HOSPITAL COURSE
56F PMH; WALSH, GERD, HLD, HTN, BARTHLOOMEW, s/p MV repair with Efraín webb, septomyomectomy  5/4/17. Patient discharged home on 5/8/17. Patient presented to PCP 5/15/17 with c/o SOB and fatigue x 1 week - labs drawn patient instructed to report to [facility] ED. HCT 23.6.    Hospital course: 5/15/17 Transfused 1U RBC                          TTE: RV strain, severe pulm HTN                          5/16/17 CTA negative for PE, elevated D-dimer and fibrinogen                          2D echo- hyperdynamic LV, pulm HTN                          Beta blocker therapy optimized                          Discharged home 5/17/17.

## 2017-05-17 NOTE — DISCHARGE NOTE ADULT - CARE PROVIDERS DIRECT ADDRESSES
,[email or web address]

## 2017-05-17 NOTE — DISCHARGE NOTE ADULT - ADDITIONAL INSTRUCTIONS
1. Daily Shower  2. Weight yourself daily and notify any weight gain greater than 2-3 pounds in 24 hours.  3. Regular diet - low fat, low cholesterol, no added salt.  4. Cleanse Midsternal incision daily while showering with warm water and mild soap, pat dry and maintain open to air.   5. Follow Cardiac Surgery Do's and Don'ts discharge instructions.   6. No driving until cleared by MD.   7. No heavy lifting nothing greater than 5 pounds until cleared by MD.   8. Call / Notify MD any fever greater than 101.0  9. Increase Activity as tolerated. Follow up with Dr. Sharma, cardiothoracic surgery, in his office on the 1st floor of [facility] on Wednesday, 5/24/17 at 9:00AM. Please call his office at [phone] if there are any questions or problems with this appointment.  Follow up with Dr. Mcintosh, outpatient cardiologist.  Follow up with Dr. Preciado, outpatient primary care physician.  Follow up with Dr. Mtz, outpatient gastroenterologist.    Discharge instructions:  1. Daily Shower  2. Weight yourself daily and notify any weight gain greater than 2-3 pounds in 24 hours.  3. Regular diet - low fat, low cholesterol, no added salt.  4. Cleanse Midsternal incision daily while showering with warm water and mild soap, pat dry and maintain open to air.   5. Follow Cardiac Surgery Do's and Don'ts discharge instructions.   6. No driving until cleared by MD.   7. No heavy lifting nothing greater than 5 pounds until cleared by MD.   8. Call / Notify MD any fever greater than 101.0  9. Increase Activity as tolerated.

## 2017-05-22 ENCOUNTER — RECORD ABSTRACTING (OUTPATIENT)
Age: 57
End: 2017-05-22

## 2017-05-22 DIAGNOSIS — Z98.890 OTHER SPECIFIED POSTPROCEDURAL STATES: ICD-10-CM

## 2017-05-22 DIAGNOSIS — Z87.898 PERSONAL HISTORY OF OTHER SPECIFIED CONDITIONS: ICD-10-CM

## 2017-05-22 DIAGNOSIS — Z86.79 PERSONAL HISTORY OF OTHER DISEASES OF THE CIRCULATORY SYSTEM: ICD-10-CM

## 2017-05-22 RX ORDER — DOCUSATE SODIUM 100 MG/1
100 CAPSULE, LIQUID FILLED ORAL 3 TIMES DAILY
Refills: 0 | Status: ACTIVE | COMMUNITY

## 2017-05-22 RX ORDER — LOSARTAN POTASSIUM AND HYDROCHLOROTHIAZIDE 50; 12.5 MG/1; MG/1
50-12.5 TABLET, FILM COATED ORAL DAILY
Qty: 90 | Refills: 3 | Status: DISCONTINUED | COMMUNITY
End: 2017-05-22

## 2017-05-22 RX ORDER — ASPIRIN 81 MG
81 TABLET, DELAYED RELEASE (ENTERIC COATED) ORAL DAILY
Qty: 1 | Refills: 0 | Status: ACTIVE | COMMUNITY

## 2017-05-22 RX ORDER — METOPROLOL TARTRATE 50 MG/1
50 TABLET, FILM COATED ORAL TWICE DAILY
Qty: 60 | Refills: 0 | Status: DISCONTINUED | COMMUNITY
End: 2017-05-22

## 2017-05-22 RX ORDER — AMOXICILLIN 500 MG/1
500 TABLET, FILM COATED ORAL
Qty: 15 | Refills: 0 | Status: DISCONTINUED | COMMUNITY
Start: 2017-04-20

## 2017-05-24 ENCOUNTER — APPOINTMENT (OUTPATIENT)
Dept: CARDIOTHORACIC SURGERY | Facility: CLINIC | Age: 57
End: 2017-05-24

## 2017-05-24 VITALS
SYSTOLIC BLOOD PRESSURE: 113 MMHG | DIASTOLIC BLOOD PRESSURE: 79 MMHG | RESPIRATION RATE: 15 BRPM | WEIGHT: 144.8 LBS | HEIGHT: 64 IN | BODY MASS INDEX: 24.72 KG/M2 | HEART RATE: 96 BPM | OXYGEN SATURATION: 100 % | TEMPERATURE: 98.3 F

## 2017-05-24 RX ORDER — IBUPROFEN 800 MG/1
800 TABLET, FILM COATED ORAL
Qty: 30 | Refills: 0 | Status: DISCONTINUED | COMMUNITY
Start: 2017-04-20

## 2017-05-24 RX ORDER — METOPROLOL TARTRATE 25 MG/1
25 TABLET, FILM COATED ORAL
Qty: 30 | Refills: 0 | Status: ACTIVE | COMMUNITY

## 2017-05-24 RX ORDER — IBUPROFEN 600 MG/1
600 TABLET, FILM COATED ORAL
Qty: 28 | Refills: 0 | Status: DISCONTINUED | COMMUNITY
Start: 2017-03-28

## 2017-05-24 RX ORDER — OXYCODONE 5 MG/1
5 TABLET ORAL
Qty: 20 | Refills: 0 | Status: DISCONTINUED | COMMUNITY
Start: 2017-03-28

## 2017-06-06 DIAGNOSIS — Z01.818 ENCOUNTER FOR OTHER PREPROCEDURAL EXAMINATION: ICD-10-CM

## 2017-06-06 DIAGNOSIS — I34.0 NONRHEUMATIC MITRAL (VALVE) INSUFFICIENCY: ICD-10-CM

## 2017-06-06 PROCEDURE — 86923 COMPATIBILITY TEST ELECTRIC: CPT

## 2017-06-06 PROCEDURE — G0463: CPT

## 2017-08-11 NOTE — DISCHARGE NOTE ADULT - MEDICATION SUMMARY - MEDICATIONS TO STOP TAKING
Quality 431: Preventive Care And Screening: Unhealthy Alcohol Use - Screening: Patient screened for unhealthy alcohol use using a single question and scores less than 2 times per year Quality 226: Preventive Care And Screening: Tobacco Use: Screening And Cessation Intervention: Patient screened for tobacco and never smoked Quality 130: Documentation Of Current Medications In The Medical Record: Current Medications Documented Detail Level: Detailed Quality 110: Preventive Care And Screening: Influenza Immunization: Influenza Immunization previously received during influenza season I will STOP taking the medications listed below when I get home from the hospital:    acetaminophen-oxycodone 325 mg-5 mg oral tablet  -- 2 tab(s) by mouth every 6 hours, As needed, Moderate Pain take one-two tabs as needed every 6 hours for pain PRN MDD:6 tabs    guaiFENesin 100 mg/5 mL oral liquid  -- 10 milliliter(s) by mouth every 8 hours, As Needed, Cough I will STOP taking the medications listed below when I get home from the hospital:    acetaminophen-oxycodone 325 mg-5 mg oral tablet  -- 2 tab(s) by mouth every 6 hours, As needed, Moderate Pain take one-two tabs as needed every 6 hours for pain PRN MDD:6 tabs    metoprolol tartrate 50 mg oral tablet  -- 1 tab(s) by mouth every 6 hours    guaiFENesin 100 mg/5 mL oral liquid  -- 10 milliliter(s) by mouth every 8 hours, As Needed, Cough

## 2018-01-30 NOTE — ED ADULT NURSE NOTE - CHIEF COMPLAINT
The patient is a 56y Female complaining of pain, dental.
Attending Attestation (For Attendings USE Only)...

## 2018-10-23 NOTE — H&P PST ADULT - NS HIV RISK FACTOR
UGIB  secondary to esophageal ulcer due to bile acid reflux   No evidence of GI bleed on CT A/P  - No

## 2018-11-23 NOTE — H&P PST ADULT - NS PRO FEM  PAP SMEARS 3YRS
Call, as rx called    ; Controlled Substances Monitoring:     RX Monitoring 11/23/2018   Attestation The Prescription Monitoring Report for this patient was reviewed today. Documentation Possible medication side effects, risk of tolerance/dependence & alternative treatments discussed. ;Obtaining appropriate analgesic effect of treatment. ;No signs of potential drug abuse or diversion identified. Acute Pain Prescriptions -   Chronic Pain Treatment objectives documented - patient is progressing appropriately. ;Functional status reviewed - continues with improved or maintaining ADL's.;Reviewed the patient's functional status and documentation. ;Dose reduction has been attempted. Medication Contracts Existing medication contract. yes

## 2020-04-25 ENCOUNTER — MESSAGE (OUTPATIENT)
Age: 60
End: 2020-04-25

## 2020-06-20 NOTE — ED PROVIDER NOTE - TEMPLATE
Date of Procedure:  06/19/2020



Surgeon:  Victor Manuel Monge MD



Assistant:  Gianna Garcia.



Reason For Admission:  Left heart catheterization, selective coronary arteriogram, angioplasty of the
 distal LAD.



Indication:  Coronary artery disease and chest pain, positive stress test, unstable angina.



History Of Present Illness:  Ms. Fowler is a 73, has had a history of circumflex stent and RCA stents 
in the past.  Has renal insufficiency, diabetes, hypertension, dyslipidemia, obesity, brought into [facility] cath lab today as an outpatient, prepped and draped in the routine sterile fashion.  A 6-Slovak she
ath introduced into the right femoral artery.  Angiography, there was normal.  Angio-Seal was used to
 close the case.  Reuben catheter left and right were used to cannulate the left main and right main
 respectively.  Her RCA showed patent proximal and mid RCA stent with about 40% to 50% distal RCA danielle
nosis, diffuse plaquing.  The circumflex had a proximal stent that is widely patent with about a 50% 
stenosis in the [id number] circumflex.  Her LAD proximal was normal but the rest of it had multipl
e sequential lesion 70%, 80%, and 90% after the second diagonal.  We decided to intervene.  We used a
n XB LAD 3.5 with side-hole guide, a cougar wire 0.014 was able to cross the lesion successfully.  We
 gave her Plavix 150 mg 1 dose.  She received Angiomax.  A 2.0 x 15 Emerge balloon was used to dilate
 all the distal lesions as stated above with 0% residual excellent results.



Complications:  There were no complications.



Blood Loss:  5 mL.



Anesthesia:  Total conscious sedation was 45 minutes.  ACT was appropriate during the during the proc
edure.



Postoperative Diagnosis:  Coronary artery disease status post successful angioplasty of the distal le
ft anterior descending.



Plan:  Plan is to for her to stay home, stay here tonight, and go home in the morning.  Continue home
 medicine.  We will resume her Eliquis tomorrow.  Continue statin.  Continue Plavix for 6 months.





NB/MODL

DD:  06/19/2020 12:44:18Voice ID:  [id number]

DT:  06/19/2020 22:00:05Report ID:  [id number]
Cardiac

## 2021-01-09 NOTE — PATIENT PROFILE ADULT. - ANESTHESIA, PREVIOUS REACTION, PROFILE
Bed: 005A  Expected date: 1/9/21  Expected time: 2:15 PM  Means of arrival: [facility]  Comments:     Annemarie Campos RN  01/09/21 [id number]
Reassessment of the patients Fall   is unchanged, the patients pain reassessment is a 0/10, Side rails up times 2, call light in reach, will continue to monitor.        Julio Gomez RN  01/09/21 0491
none

## 2021-05-23 NOTE — ED ADULT NURSE NOTE - NS ED NOTE  TALK SOMEONE YN
Cr of 1.87 this admission. Cr noted to be 1.10 in 9/2019. Likely 2/2 diabetic nephropathy vs sepsis vs poor po intake. s/p 1L bolus in ED  - Cr  - dialy BMP [ruled out]  Dopplers ordered in setting of u/l leg swelling  on admission- negative for DVT    #Sinus tachycardia    on admission. EKG showing sinus rhythm. Denies any pain. Likely 2/2 infection and dehydration. s/p 1 L bolus. PE on the differential given RLE swelling, however no hypoxia, D-dimer and RLE duplex pending.   - Monitor vitals  - Control pain with Tylenol prn no

## 2021-08-24 NOTE — H&P ADULT. - PRO ARRIVE FROM
MEDICARE WELLNESS VISIT + NOTE    CHIEF COMPLAINT:  Jeff Cruz presents for his Subsequent Annual Medicare Wellness Visit.   His additional complaints or concerns are addressed below.      Patient Care Team:  Dre Mckee MD as PCP - General        Patient Active Problem List   Diagnosis   • Controlled diabetes mellitus (CMS/HCC)   • Indolent non-Hodgkins lymphoma (CMS/HCC)   • Hypertension associated with diabetes (CMS/HCC)   • Rotator cuff tendonitis   • Benign colonic polyp   • Insomnia         History reviewed. No pertinent past medical history.      Past Surgical History:   Procedure Laterality Date   • No past surgeries           Social History     Tobacco Use   • Smoking status: Former Smoker   • Smokeless tobacco: Never Used   Substance Use Topics   • Alcohol use: Yes   • Drug use: Never     Family History   Problem Relation Age of Onset   • Hypertension Mother    • Patient is unaware of any medical problems Father          Current Outpatient Medications   Medication Sig Dispense Refill   • hydrochlorothiazide (HYDRODIURIL) 25 MG tablet Take 1 tablet by mouth daily. 90 tablet 3   • lisinopril (ZESTRIL) 10 MG tablet Take 1 tablet by mouth daily. 90 tablet 3   • sildenafil (VIAGRA) 100 MG tablet 1/2 tablet to 1 tablet po x 1 dose prior to sex. 10 tablet 2   • ibuprofen (MOTRIN) 800 MG tablet Take 1 tablet by mouth 2 times daily as needed for Pain. With food 180 tablet 1   • HYDROcodone-acetaminophen (NORCO) 7.5-325 MG per tablet 1 tablet every 4-6 hours as needed for pain. 30 tablet 0   • glipiZIDE (GLUCOTROL) 5 MG tablet Take 1 tablet by mouth daily. 90 tablet 1   • blood glucose (PEDRO PABLO CONTOUR NEXT TEST) test strip Test blood sugar daily for DM E11.9 100 strip 3     No current facility-administered medications for this visit.        The following items on the Medicare Health Risk Assessment were found to be positive  2.) Would you like additional information on advance directives?: Yes     6 a.) How  home many servings of Fruits and Vegetables do you have each day ( 1 serving = 1 piece of fruit, 1/2 cup fruits or vegetables): 1 per day     6 b.) How many servings of High Fiber / Whole Grain Foods to you have each day ( 1 serving = 1 cup cold cereal, 1/2 cup cooked cereal, 1 slice bread): 1 per day     11d.) Bodily pain: Always     11e.) Tiredness or Fatigue: Always         Vision and Hearing screens:    Hearing Screening    125Hz 250Hz 500Hz 1000Hz 2000Hz 3000Hz 4000Hz 6000Hz 8000Hz   Right ear:            Left ear:            Comments: No able to hear whisper test      Visual Acuity Screening    Right eye Left eye Both eyes   Without correction:      With correction: 20/20 20/20 20/20       Advance Directive:   The patient has the following documents:  No Advance Directives on file. Patient offered documents.    Cognitive/Functional Status: no evidence of cognitive dysfunction by direct observation.    Opioid Review: Jeff is not taking opioid medications.    Recent PHQ 2/9 Score:    PHQ 2:  Date Adult PHQ 2 Score Adult PHQ 2 Interpretation   8/23/2021 0 No further screening needed       PHQ 9:       DEPRESSION ASSESSMENT/PLAN:  Depression screening is negative no further plan needed.     Body mass index is 25.84 kg/m².    BMI ASSESSMENT/PLAN:  Patient is overweight.    Recommended dietary modification, exercise regimen and weight loss.         See Patient Instructions section.   No follow-ups on file.      OUTPATIENT PROGRESS NOTE    Subjective   Chief Complaint Office Visit and Medicare Wellness Visit    Patient has given consent to record this visit for documentation in their clinical record.    HPI     Wellness exam:  He stepped on a nail yesterday; however, no skin break.   Reports receiving two doses of COVID-19 vaccination in April/May 2021 in [place].     He is off Remeron.   Labs were stable except elevated chloride and fasting blood glucose levels.     Need for prophylactic vaccination with Streptococcus  pneumoniae (Pneumococcus) and Influenza vaccines: Due for the influenza vaccination. Due for second dose of the pneumococcal vaccination since December 2020. Received the first dose of pneumococcal vaccination in 2019.     Colon cancer screening/Benign colonic polyp: Due for the screening colonoscopy. Had FOBT last year. No change in bowel habits. Denies blood in bowel or constipation. Reports occasional change in bowel movement as per the diet.      Hypertension associated with diabetes: The rooming blood pressure was elevated (systolic- 151 mmHg). Total cholesterol and LDL were 157 mg/dL and 94 mg/dL respectively. On Lisinopril since he was in his 30s. He has been off Hydrochlorothiazide for around six weeks because he ran out off medication. Denies chest pain, shortness of breath, headache, or dizziness.     Indolent non-Hodgkins lymphoma: Blood count and Lactate dehydrogenase level was normal.     Controlled type 2 diabetes mellitus: Last labs revealed fasting blood glucose level as 113 mg/dL. The A1c improved from 6.2 % to 5.8 % since last year. Renal  function (GFR) was at 88 mL/min/1.73m2. On Glyburide 5 mg once daily before dinner. He does not monitor his blood glucose level at home. Denies numbness or tingling in the toes. Had an ophthalmologic exam recently in [place].     Erectile dysfunction: On Sildenafil. Wants refill.     Tendinitis of right rotator cuff: He still has shoulder pain. The pain is usually significant; however, mild at this time. Takes Ibuprofen and Norco as needed. Has not received shoulder injection. He wants to consult for acupuncture. He visits a pain specialist for degenerative disc disease. Epidural injections was administered by the pain clinic.     Depression screening negative: He is emotionally stable.     Medications  Medications were reviewed and updated today.    Histories  I have personally reviewed and updated the patient's past medical, past surgical, family and social  histories during today's visit.    Review of Systems    Constitutional: Per HPI.  Respiratory: Per HPI.   Cardiovascular: Per HPI.   Gastrointestinal: Per HPI.   Neuro: Per HPI.   Psychiatric: Per HPI.   Musculoskeletal: Per HPI.     Objective   Visit Vitals  BP (!) 152/88   Pulse 93   Temp 98 °F (36.7 °C)   Resp 18   Ht 5' 7\" (1.702 m)   Wt 74.8 kg (165 lb)   SpO2 97%   BMI 25.84 kg/m²     Physical Exam    Constitutional: Alert, in no acute distress and current vital signs reviewed.   Head and Face: Atraumatic and normocephalic.   Eyes: No discharge, no eyelid swelling and the sclerae were normal.   ENT: Oropharynx normal. Normal appearing outer ear. Tympanic membranes are bilaterally clear, normal appearing nose and normal lips.   Neck: Normal appearing neck and supple neck.  Pulmonary: Breath sounds clear to auscultation bilaterally, but no respiratory distress and normal respiratory rate and effort.   Cardiovascular: Normal rate, regular rhythm, normal S1, normal S2 and edema was not present in the lower extremities.   Abdomen: Soft and nontender.   Musculoskeletal: Normal gait. Normal range of motion. There was no joint instability noted. Muscle strength and tone were normal.  Neurologic: Cranial nerves grossly intact.  No sensory deficits noted. No coordination deficits.   Psychiatric: Alert and awake, interactive and mood/affect were appropriate.   Skin, Hair, Nails: Normal skin color and pigmentation.    Laboratory  I have reviewed the pertinent laboratory tests.     Imaging  I have reviewed the pertinent imaging study reports.     Assessment & Plan   Diagnoses and associated orders for this visit:  1. General medical exam  2. Need for prophylactic vaccination with Streptococcus pneumoniae (Pneumococcus) and Influenza vaccines  -     PNEUMOCOCCAL POLYSACCHARIDE ADULT VACC, IM/SQ (PNEUMOVAX 23)  3. Colon cancer screening  -     Occult Blood - iFOB (aka FIT)  4. Hypertension associated with diabetes  (CMS/HCC)  -     hydroCHLOROthiazide  -     Lisinopril  5. Benign colonic polyp  6. Indolent non-Hodgkins lymphoma (CMS/HCC)  7. Controlled type 2 diabetes mellitus without complication, without long-term current use of insulin (CMS/HCC)  -     glipiZIDE  8. Erectile dysfunction, unspecified erectile dysfunction type  -     Sildenafil Citrate  9. Tendinitis of right rotator cuff  -     Ibuprofen  -     HYDROcodone-Acetaminophen  10. Patient has active power of  for health care  11. Exercise counseling  12. Depression screening negative  13. Nutritional counseling    General medical exam:  Reviewed and discussed previous reports.   Current medications reviewed and reconciled.   Immunizations reviewed.   Discontinue Remeron 15 mg.   Advised to send the details of the COVID-19 vaccination. Recommended receiving COVID-19 booster dose eight months after the second dose.  Recommended tetanus vaccination. Patient declined.     Need for prophylactic vaccination with Streptococcus pneumoniae (Pneumococcus) and Influenza vaccines:  Administered the second dose of Pneumovax 23 vaccine in the office today.  Guidelines, protocol, risks, benefits, and insurance coverage was explained to the patient.    Colon cancer screening/Benign colonic polyp:  Reviewed and discussed previous reports.     Recommended colonoscopy. Patient declined.  Ordered FOBT; refer to orders. Explained the procedure and schedule.      Hypertension associated with diabetes (CMS/HCC):  Not controlled; possibly due to non-compliance of the patient with Hydrochlorothiazide.   Blood pressure measured in office today by Dr. Mckee was elevated (152/88 mmHg).   Reviewed and discussed previous reports.     Current medication reviewed.   Refilled Hydrochlorothiazide 25 to be taken as directed.   Follow up with the PCP and recheck BP in [place].    Indolent non-Hodgkins lymphoma (CMS/HCC):  Controlled.  Reviewed and discussed previous reports.       Controlled  type 2 diabetes mellitus without complication, without long-term current use of insulin (CMS/[facility]):  Controlled.   Reviewed and discussed previous reports.     Current medication reviewed.     Continue current medication.  Prescribed Glipizide 5 mg to be taken as directed; mode of action, benefits and side effects explained.  Diabetic eye exam to rule out retinopathy was done in the office today.      Erectile dysfunction, unspecified erectile dysfunction type:  Current medication reviewed.     Refilled Sildenafil 100 mg to be taken as directed.     Tendinitis of right rotator cuff:  Symptomatic.  Reviewed and discussed medical history.  Current medications reviewed.     Refilled Norco and Ibuprofen to be taken as directed.     Patient has active power of  for health care:  Completed.    Exercise counseling:  Recommended exercising.       Depression screening negative:  He is not depressed.    Nutritional counseling:  Recommended weight loss and dietary modifications.    Refer to orders.  Medical compliance with plan discussed and risks of non-compliance reviewed.  Patient education completed on disease process, etiology & prognosis.  Proper usage and side effects of medications reviewed & discussed.  Return to clinic as clinically indicated as discussed with patient who verbalized understanding of the plan and is in agreement with the plan.    I,  Dr. Juan Alberto Pop, have created a visit summary document based on the audio recording between Dr. Dre Mckee MD and this patient for the physician to review, edit as needed, and authenticate.  Creation Date: 8/24/2021      I have reviewed and edited the visit summary above and attest that it is accurate.

## 2021-09-07 NOTE — PATIENT PROFILE ADULT. - NS PRO TALK SOMEONE YN
Addended by: NALINI RODRÍGUEZ on: 7/27/2020 04:46 PM     Modules accepted: Orders     Care taken over for BRYSON Serrano, with Cytoxan infusing. Cytoxan completed, port de-accessed. On pro placed. Patient discharged home in stable condition to the care of her family.    no

## 2022-08-04 NOTE — H&P CARDIOLOGY - DATE:
16-Mar-2017 [Former] : former [TextBox_4] : Has been feeling SOB on occasion. Mostly LEE. \par  [YearQuit] : 2000 [Difficulty Maintaining Sleep] : does not have difficulty maintaining sleep

## 2022-10-02 ENCOUNTER — EMERGENCY (EMERGENCY)
Facility: HOSPITAL | Age: 62
LOS: 1 days | Discharge: HOME | End: 2022-10-02
Attending: EMERGENCY MEDICINE | Admitting: EMERGENCY MEDICINE

## 2022-10-02 VITALS
SYSTOLIC BLOOD PRESSURE: 159 MMHG | TEMPERATURE: 98 F | HEART RATE: 85 BPM | DIASTOLIC BLOOD PRESSURE: 75 MMHG | RESPIRATION RATE: 18 BRPM | HEIGHT: 64 IN | OXYGEN SATURATION: 98 %

## 2022-10-02 DIAGNOSIS — Z98.890 OTHER SPECIFIED POSTPROCEDURAL STATES: Chronic | ICD-10-CM

## 2022-10-02 LAB
ALBUMIN SERPL ELPH-MCNC: 4.4 G/DL — SIGNIFICANT CHANGE UP (ref 3.3–5)
ALP SERPL-CCNC: 105 U/L — SIGNIFICANT CHANGE UP (ref 40–120)
ALT FLD-CCNC: 29 U/L — SIGNIFICANT CHANGE UP (ref 4–33)
ANION GAP SERPL CALC-SCNC: 13 MMOL/L — SIGNIFICANT CHANGE UP (ref 7–14)
AST SERPL-CCNC: 32 U/L — SIGNIFICANT CHANGE UP (ref 4–32)
BASOPHILS # BLD AUTO: 0.06 K/UL — SIGNIFICANT CHANGE UP (ref 0–0.2)
BASOPHILS NFR BLD AUTO: 0.9 % — SIGNIFICANT CHANGE UP (ref 0–2)
BILIRUB SERPL-MCNC: 0.3 MG/DL — SIGNIFICANT CHANGE UP (ref 0.2–1.2)
BUN SERPL-MCNC: 10 MG/DL — SIGNIFICANT CHANGE UP (ref 7–23)
CALCIUM SERPL-MCNC: 10 MG/DL — SIGNIFICANT CHANGE UP (ref 8.4–10.5)
CHLORIDE SERPL-SCNC: 100 MMOL/L — SIGNIFICANT CHANGE UP (ref 98–107)
CO2 SERPL-SCNC: 24 MMOL/L — SIGNIFICANT CHANGE UP (ref 22–31)
CREAT SERPL-MCNC: 0.76 MG/DL — SIGNIFICANT CHANGE UP (ref 0.5–1.3)
EGFR: 87 ML/MIN/1.73M2 — SIGNIFICANT CHANGE UP
EOSINOPHIL # BLD AUTO: 0.09 K/UL — SIGNIFICANT CHANGE UP (ref 0–0.5)
EOSINOPHIL NFR BLD AUTO: 1.3 % — SIGNIFICANT CHANGE UP (ref 0–6)
GLUCOSE SERPL-MCNC: 93 MG/DL — SIGNIFICANT CHANGE UP (ref 70–99)
HCT VFR BLD CALC: 41.1 % — SIGNIFICANT CHANGE UP (ref 34.5–45)
HGB BLD-MCNC: 12.7 G/DL — SIGNIFICANT CHANGE UP (ref 11.5–15.5)
IANC: 4.58 K/UL — SIGNIFICANT CHANGE UP (ref 1.8–7.4)
IMM GRANULOCYTES NFR BLD AUTO: 0.3 % — SIGNIFICANT CHANGE UP (ref 0–0.9)
LYMPHOCYTES # BLD AUTO: 1.39 K/UL — SIGNIFICANT CHANGE UP (ref 1–3.3)
LYMPHOCYTES # BLD AUTO: 20 % — SIGNIFICANT CHANGE UP (ref 13–44)
MCHC RBC-ENTMCNC: 25.9 PG — LOW (ref 27–34)
MCHC RBC-ENTMCNC: 30.9 GM/DL — LOW (ref 32–36)
MCV RBC AUTO: 83.9 FL — SIGNIFICANT CHANGE UP (ref 80–100)
MONOCYTES # BLD AUTO: 0.8 K/UL — SIGNIFICANT CHANGE UP (ref 0–0.9)
MONOCYTES NFR BLD AUTO: 11.5 % — SIGNIFICANT CHANGE UP (ref 2–14)
NEUTROPHILS # BLD AUTO: 4.58 K/UL — SIGNIFICANT CHANGE UP (ref 1.8–7.4)
NEUTROPHILS NFR BLD AUTO: 66 % — SIGNIFICANT CHANGE UP (ref 43–77)
NRBC # BLD: 0 /100 WBCS — SIGNIFICANT CHANGE UP (ref 0–0)
NRBC # FLD: 0 K/UL — SIGNIFICANT CHANGE UP (ref 0–0)
PLATELET # BLD AUTO: 340 K/UL — SIGNIFICANT CHANGE UP (ref 150–400)
POTASSIUM SERPL-MCNC: 4.2 MMOL/L — SIGNIFICANT CHANGE UP (ref 3.5–5.3)
POTASSIUM SERPL-SCNC: 4.2 MMOL/L — SIGNIFICANT CHANGE UP (ref 3.5–5.3)
PROT SERPL-MCNC: 8.5 G/DL — HIGH (ref 6–8.3)
RBC # BLD: 4.9 M/UL — SIGNIFICANT CHANGE UP (ref 3.8–5.2)
RBC # FLD: 15.4 % — HIGH (ref 10.3–14.5)
SODIUM SERPL-SCNC: 137 MMOL/L — SIGNIFICANT CHANGE UP (ref 135–145)
WBC # BLD: 6.94 K/UL — SIGNIFICANT CHANGE UP (ref 3.8–10.5)
WBC # FLD AUTO: 6.94 K/UL — SIGNIFICANT CHANGE UP (ref 3.8–10.5)

## 2022-10-02 PROCEDURE — 70450 CT HEAD/BRAIN W/O DYE: CPT | Mod: 26,MA

## 2022-10-02 PROCEDURE — 72125 CT NECK SPINE W/O DYE: CPT | Mod: 26,MA

## 2022-10-02 PROCEDURE — 99285 EMERGENCY DEPT VISIT HI MDM: CPT

## 2022-10-02 PROCEDURE — 71045 X-RAY EXAM CHEST 1 VIEW: CPT | Mod: 26

## 2022-10-02 RX ORDER — ACETAMINOPHEN 500 MG
1000 TABLET ORAL ONCE
Refills: 0 | Status: COMPLETED | OUTPATIENT
Start: 2022-10-02 | End: 2022-10-02

## 2022-10-02 RX ORDER — SODIUM CHLORIDE 9 MG/ML
1000 INJECTION INTRAMUSCULAR; INTRAVENOUS; SUBCUTANEOUS ONCE
Refills: 0 | Status: COMPLETED | OUTPATIENT
Start: 2022-10-02 | End: 2022-10-02

## 2022-10-02 RX ORDER — LIDOCAINE 4 G/100G
1 CREAM TOPICAL ONCE
Refills: 0 | Status: COMPLETED | OUTPATIENT
Start: 2022-10-02 | End: 2022-10-02

## 2022-10-02 RX ORDER — METOCLOPRAMIDE HCL 10 MG
10 TABLET ORAL ONCE
Refills: 0 | Status: COMPLETED | OUTPATIENT
Start: 2022-10-02 | End: 2022-10-02

## 2022-10-02 RX ADMIN — Medication 400 MILLIGRAM(S): at 17:17

## 2022-10-02 RX ADMIN — SODIUM CHLORIDE 1000 MILLILITER(S): 9 INJECTION INTRAMUSCULAR; INTRAVENOUS; SUBCUTANEOUS at 17:24

## 2022-10-02 RX ADMIN — LIDOCAINE 1 PATCH: 4 CREAM TOPICAL at 17:18

## 2022-10-02 RX ADMIN — Medication 10 MILLIGRAM(S): at 17:18

## 2022-10-02 NOTE — ED PROVIDER NOTE - NSFOLLOWUPCLINICS_GEN_ALL_ED_FT
[facility] - [facility]  Ortho/Spine  [address] [place] [id number]  Phone: [phone]  Fax:   Follow Up Time: 1-3 Days

## 2022-10-02 NOTE — ED PROVIDER NOTE - ATTENDING CONTRIBUTION TO CARE
I performed a face to face history and physical exam of the patient and discussed their management with the resident. I reviewed the resident's note and agree with the documented findings and plan of care.    61 y/o female with hx of HTN, HLD, GERD, hx of valvular surgery 5 years ago, with palpitations since then, p/w neck pain 5 days ago, started when she woke up from her sleep, now radiating to her head, with frontal headache, pt also c/o anterior neck/chest pain to touch, no n/v, no visual changes, no numbness, no weakness, no CP, no SOB, pt has only tried tylenol, aspirin, with some relief, no fever, no rash, no cough, no recent trauma, though pt states she slipped out of bed couple of weeks ago, on exam pt with midline mid-cervical spine tenderness, large lipoma over mid thoracic spine nontender, neurologic exam intact, motor strength 5/5 b/l upper/lower ext, sensation intact, pt with point tenderness over proximal clavicle, no mass/tenderness to palpation of anterior neck, will obtain CT head/neck, labs, EKG, pain managment

## 2022-10-02 NOTE — ED PROVIDER NOTE - CLINICAL SUMMARY MEDICAL DECISION MAKING FREE TEXT BOX
62-year-old female with a past medical history of HTN, HLD, GERD, open heart mitral valve replacement presents to the ED with 3-day history of neck pain and 1 day history of headaches.  Patient endorses pain over the anterior chest over the proximal clavicle tender to palpation.  Initial vitals nonactionable.  Physical exam as noted above.  Lipoma noted over the right paraspinal lower cervical region.  Nontender to palpation.  No cervical lymphadenopathy.  Given patient's lack of fever, focal neurologic deficits, and baseline mentation patient is unlikely experiencing meningitis/encephalitis at this time.  Differential diagnosis includes but not limited to torticollis versus muscle strain versus tension headache/migraine headache.  Will order labs, EKG, meds, imaging, and reassess.

## 2022-10-02 NOTE — ED PROVIDER NOTE - OBJECTIVE STATEMENT
62-year-old female with a past medical history of HTN, HLD, GERD, open heart mitral valve replacement presents to the ED with neck pain over the last 3 days.  Patient also endorses associated headache that started today.  She denies any fevers, chills, nausea, vomiting, diarrhea.  She denies any focal neurologic deficits, numbness, or tingling.  She is is able to ambulate without difficulty.  She is able to perform her ADLs without difficulty.  As per son at bedside, patient is at baseline mentally.  No other sick contacts at home.  Up-to-date on vaccinations.  Her neck pain is localized over the right occiput region radiating down the lateral side of the right neck.  Worse with movements of the neck.  She rates her pain as moderate to severe.  She denies any chest pain, shortness of breath, abdominal pain, urinary symptoms.  She denies any other symptoms at bedside.

## 2022-10-02 NOTE — ED PROVIDER NOTE - NSFOLLOWUPINSTRUCTIONS_ED_ALL_ED_FT
-You were seen in the Emergency Department (ED) for neck pain. Lab and imaging results, if performed, were discussed with you along with your discharge diagnosis.    FOLLOW-UP:  -Please follow up with your PMD if symptoms return or for any concerning matter pertaining to your neck pain.  -Please follow up with your private physician within the next 72 hours. Tell them you were recently in the ED for an urgent issue and would like to be seen. Bring copies of your results if you were given.   -If you do not have a PMD, please call [phone] to find one convenient for you or call our clinic at [phone].    MEDICATIONS:  -Continue all other prescribed medicine, IF ANY, as per your primary care doctor's (PMD) recommendations.    PAIN CONTROL:  -Please take over the counter Tylenol (also known as acetaminophen) 650mg every 6 hours or Ibuprofen (also known as motrin, advil) 600mg every 8 hour for your pain, IF ANY, unless you are not supposed to for any reason.  -Rest, stay hydrated with plenty of fluids (drink at least 2 Liters or 64 Ounces of water each day UNLESS you are supposed to restrict fluids or ANY reason.    RETURN PRECAUTIONS:  -Please return to the Emergency Department if you experience ANY new or concerning symptoms, such as, but not limited to: worsening pain, large amount of bleeding, passing out, fever >100.F, shaking chills, inability to see or new double vision, chest pain, difficulty breathing, diffuse abdominal pain, unable to eat or drink, continuous vomiting or diarrhea, unable to move or feel part of your body

## 2022-10-02 NOTE — ED PROVIDER NOTE - NSICDXPASTMEDICALHX_GEN_ALL_CORE_FT
PAST MEDICAL HISTORY:  Anemia, unspecified type     Fatty liver     GERD (gastroesophageal reflux disease)     HLD (hyperlipidemia)     HTN (hypertension)     Mitral valve insufficiency     MR (mitral regurgitation)

## 2022-10-02 NOTE — ED PROVIDER NOTE - ADDITIONAL NOTES AND INSTRUCTIONS:
Please excuse from work/school as he/she was being evaluated in the Emergency Room at [facility].

## 2022-10-02 NOTE — ED ADULT NURSE NOTE - OBJECTIVE STATEMENT
pt received spot 14. pt A+Ox3 c/o headache and pain to back of head and neck x 5 days. pt denies fever/chills/cp/sob. pt states " I have palpitations every night before I go to bed since my heart surgery." denies palpitations at this time. respirations even and unlabored. NSR noted on CM.  vss. labs sent. IVSL in place. medicated as ordered. family at bedside. NAD noted. will monitor.

## 2022-10-02 NOTE — ED PROVIDER NOTE - PROGRESS NOTE DETAILS
patient reassessed at this time. neck pain and headaches resolved. asymptomatic at bedside. tenderness at proximal clavicle improved. no dislocation or fractures noted on CXR. no overlying skin changes. likely MSK symptoms in nature. will dc patient with tylenol and motrin and spine follow up. strict return precautions relayed to patient.

## 2022-10-02 NOTE — ED PROVIDER NOTE - NSICDXFAMILYHX_GEN_ALL_CORE_FT
FAMILY HISTORY:  Family history of heart disease    Sibling  Still living? Yes, Estimated age: Age Unknown  Family history of hypertension, Age at diagnosis: Age Unknown

## 2022-10-02 NOTE — ED PROVIDER NOTE - PHYSICAL EXAMINATION
GENERAL: no acute distress, non-toxic appearing  HEAD: normocephalic, atraumatic  HEENT: normal conjunctiva, oral mucosa moist, neck supple  CARDIAC: regular rate and rhythm, normal S1 and S2,  no appreciable murmurs  PULM: clear to ascultation bilaterally, no crackles, rales, rhonchi, or wheezing  GI: abdomen nondistended, soft, nontender, no guarding or rebound tenderness  : no CVA tenderness, no suprapubic tenderness  NEURO: alert and oriented x 3, normal speech, ambulating without difficulty, no pronator drift, romberg negative  MSK: no visible deformities, no peripheral edema, tenderness to palpation over the R proximal clavicle, lipoma over R paraspinal area in lower cervical region  SKIN: no visible rashes, dry, well-perfused  PSYCH: appropriate mood and affect

## 2022-10-02 NOTE — ED PROVIDER NOTE - PATIENT PORTAL LINK FT
You can access the FollowMyHealth Patient Portal offered by [facility] by registering at the following website: [email or web address]. By joining [employer]’s FollowMyHealth portal, you will also be able to view your health information using other applications (apps) compatible with our system.

## 2022-10-03 PROBLEM — I34.0 NONRHEUMATIC MITRAL (VALVE) INSUFFICIENCY: Chronic | Status: ACTIVE | Noted: 2017-03-16

## 2022-10-03 PROBLEM — D64.9 ANEMIA, UNSPECIFIED: Chronic | Status: ACTIVE | Noted: 2017-05-15

## 2022-10-03 PROBLEM — I34.0 NONRHEUMATIC MITRAL (VALVE) INSUFFICIENCY: Chronic | Status: ACTIVE | Noted: 2017-03-22

## 2022-10-03 PROBLEM — K76.0 FATTY (CHANGE OF) LIVER, NOT ELSEWHERE CLASSIFIED: Chronic | Status: ACTIVE | Noted: 2017-03-22

## 2022-12-07 NOTE — ED ADULT NURSE NOTE - NS PRO PASSIVE SMOKE EXP
[FreeTextEntry1] : I had a discussion regarding today's visit, the diagnosis and treatment recommendations and options.  We also discussed changes since the last visit.  \par \par With regard to the left shoulder, I recommended observation and activity modification. She will follow up as needed in this regard.\par \par With regard to the right little finger, I recommended continued full-time splinting and follow-up in 2 weeks.\par \par The patient and her daughter have agreed to the above plan of management and has expressed full understanding.  All questions were fully answered to their satisfaction.\par \par My cumulative time spent on today's visit was greater than 30 minutes and included: Preparation for the visit, review of the medical records, review of pertinent diagnostic studies, examination and counseling of the patient on the above diagnosis, treatment plan and prognosis, orders of diagnostic tests, medications and/or appropriate procedures and documentation in the medical records of today's visit. No

## 2024-04-10 NOTE — ED ADULT NURSE NOTE - PERIPHERAL VASCULAR
"Chief Complaint   Patient presents with    RECHECK     /87 (BP Location: Right arm, Patient Position: Sitting, Cuff Size: Adult Large)   Pulse 93   Resp 18   Ht 1.685 m (5' 6.34\")   Wt 149.6 kg (329 lb 11.2 oz)   SpO2 98%   BMI 52.67 kg/m      MUSTAPHA NEWBERRY    "
WDL

## 2025-05-15 NOTE — H&P ADULT. - PROBLEM SELECTOR PLAN 1
Situation:  Outreach for routine follow up.    Key Assessments:  Spoke to patient briefly - she states she is doing well and \"Talk to my dtr Michelle\"     Actions Taken:  Called and spoke to pt's dtr Michelle Castro -busy at this time asked to call other day     Program Plan:   Will try tomorrow to speak to pt's dtr Michelle    See hyperlinks within encounter for full documentation     Admit to telemetry.   Cards c/s appreciated.   Echocardiogram, cxr, ekg, trend ce X2.   discontinue losartan- hctz and start metoprolol 25 mg. If patient tolerates metoprolol, can increase to metoprolol 50 mg BID, IV bolus 250 cc.  f/u MD note

## 2025-06-11 NOTE — ED ADULT NURSE NOTE - OBJECTIVE STATEMENT
saw cardiology on June 9th for the episode of SVT. No recurrence of SVT so cardiology will not start any medication at this point but if she did have a recurrence, can treat with diltiazem and verapamil per cardiology.   Pt is C/O Severe tooth ache in left side . Pt has a heart condition  Pt states due to this reason she is not getting appropriate medication  for tooth pain Jennyfer N/V/D fever chills afebrile here awaiting  for the  ED eval